# Patient Record
Sex: MALE | Race: WHITE | URBAN - METROPOLITAN AREA
[De-identification: names, ages, dates, MRNs, and addresses within clinical notes are randomized per-mention and may not be internally consistent; named-entity substitution may affect disease eponyms.]

---

## 2021-08-20 ENCOUNTER — TELEPHONE (OUTPATIENT)
Dept: INTERNAL MEDICINE CLINIC | Age: 86
End: 2021-08-20

## 2021-08-20 NOTE — TELEPHONE ENCOUNTER
----- Message from [facility] sent at 8/20/2021  9:01 AM EDT -----  Regarding: Dr. Julisa Schwartz Message/Vendor Calls    Caller's first and last name: Pt      Reason for call: Pt checking to see if the records from his previous provider have been received      Callback required yes/no and why: Yes      Best contact number(s): [phone], [phone]      Details to clarify the request: N/A      [facility]

## 2021-08-20 NOTE — TELEPHONE ENCOUNTER
Patient advise that records could take a couple days. Patient filled out release form yesterday. No records as of now. Patient NP appt is Oct 2021. Patient was thankful for the call back.

## 2021-08-31 ENCOUNTER — TELEPHONE (OUTPATIENT)
Dept: INTERNAL MEDICINE CLINIC | Age: 86
End: 2021-08-31

## 2021-08-31 NOTE — TELEPHONE ENCOUNTER
----- Message from Gwen Smith sent at 2021 10:19 AM EDT -----  Regarding: Dr. Lamas [phone]  General Message/Vendor Calls    Caller's first and last name: Pt.       Reason for call: Wants to know if office received medical records. Callback required yes/no and why: Yes, confirm medical records. Best contact number(s): [phone]      Details to clarify the request: N/a.       Gwen Smith

## 2021-09-20 ENCOUNTER — TELEPHONE (OUTPATIENT)
Dept: INTERNAL MEDICINE CLINIC | Age: 86
End: 2021-09-20

## 2021-10-12 ENCOUNTER — OFFICE VISIT (OUTPATIENT)
Dept: INTERNAL MEDICINE CLINIC | Age: 86
End: 2021-10-12
Payer: MEDICARE

## 2021-10-12 ENCOUNTER — TELEPHONE (OUTPATIENT)
Dept: INTERNAL MEDICINE CLINIC | Age: 86
End: 2021-10-12

## 2021-10-12 VITALS
WEIGHT: 151 LBS | HEIGHT: 70 IN | DIASTOLIC BLOOD PRESSURE: 76 MMHG | HEART RATE: 65 BPM | TEMPERATURE: 97.5 F | BODY MASS INDEX: 21.62 KG/M2 | SYSTOLIC BLOOD PRESSURE: 171 MMHG | RESPIRATION RATE: 13 BRPM

## 2021-10-12 DIAGNOSIS — R53.83 FATIGUE, UNSPECIFIED TYPE: ICD-10-CM

## 2021-10-12 DIAGNOSIS — R79.9 ABNORMAL FINDING OF BLOOD CHEMISTRY, UNSPECIFIED: ICD-10-CM

## 2021-10-12 DIAGNOSIS — E78.5 HYPERLIPIDEMIA, UNSPECIFIED HYPERLIPIDEMIA TYPE: ICD-10-CM

## 2021-10-12 DIAGNOSIS — N40.0 BENIGN PROSTATIC HYPERPLASIA, UNSPECIFIED WHETHER LOWER URINARY TRACT SYMPTOMS PRESENT: ICD-10-CM

## 2021-10-12 DIAGNOSIS — H40.9 GLAUCOMA, UNSPECIFIED GLAUCOMA TYPE, UNSPECIFIED LATERALITY: ICD-10-CM

## 2021-10-12 DIAGNOSIS — C67.9 MALIGNANT NEOPLASM OF URINARY BLADDER, UNSPECIFIED SITE (HCC): ICD-10-CM

## 2021-10-12 DIAGNOSIS — J44.9 CHRONIC OBSTRUCTIVE PULMONARY DISEASE, UNSPECIFIED COPD TYPE (HCC): ICD-10-CM

## 2021-10-12 DIAGNOSIS — E55.9 VITAMIN D DEFICIENCY, UNSPECIFIED: ICD-10-CM

## 2021-10-12 DIAGNOSIS — R68.89 OTHER GENERAL SYMPTOMS AND SIGNS: ICD-10-CM

## 2021-10-12 DIAGNOSIS — M79.604 PAIN IN BOTH LOWER EXTREMITIES: ICD-10-CM

## 2021-10-12 DIAGNOSIS — M79.605 PAIN IN BOTH LOWER EXTREMITIES: ICD-10-CM

## 2021-10-12 DIAGNOSIS — I95.1 ORTHOSTATIC HYPOTENSION: Primary | ICD-10-CM

## 2021-10-12 LAB
25(OH)D3 SERPL-MCNC: 37 NG/ML (ref 30–100)
ALBUMIN SERPL-MCNC: 4.2 G/DL (ref 3.5–5)
ALBUMIN/GLOB SERPL: 1.3 {RATIO} (ref 1.1–2.2)
ALP SERPL-CCNC: 57 U/L (ref 45–117)
ALT SERPL-CCNC: 25 U/L (ref 12–78)
ANION GAP SERPL CALC-SCNC: 4 MMOL/L (ref 5–15)
AST SERPL-CCNC: 17 U/L (ref 15–37)
BASOPHILS # BLD: 0 K/UL (ref 0–0.1)
BASOPHILS NFR BLD: 0 % (ref 0–1)
BILIRUB SERPL-MCNC: 0.4 MG/DL (ref 0.2–1)
BUN SERPL-MCNC: 36 MG/DL (ref 6–20)
BUN/CREAT SERPL: 30 (ref 12–20)
CALCIUM SERPL-MCNC: 9.7 MG/DL (ref 8.5–10.1)
CHLORIDE SERPL-SCNC: 112 MMOL/L (ref 97–108)
CHOLEST SERPL-MCNC: 202 MG/DL
CO2 SERPL-SCNC: 21 MMOL/L (ref 21–32)
CREAT SERPL-MCNC: 1.2 MG/DL (ref 0.7–1.3)
DIFFERENTIAL METHOD BLD: ABNORMAL
EOSINOPHIL # BLD: 0.1 K/UL (ref 0–0.4)
EOSINOPHIL NFR BLD: 2 % (ref 0–7)
ERYTHROCYTE [DISTWIDTH] IN BLOOD BY AUTOMATED COUNT: 13.4 % (ref 11.5–14.5)
EST. AVERAGE GLUCOSE BLD GHB EST-MCNC: 117 MG/DL
FERRITIN SERPL-MCNC: 239 NG/ML (ref 26–388)
FOLATE SERPL-MCNC: 13.9 NG/ML (ref 5–21)
GLOBULIN SER CALC-MCNC: 3.2 G/DL (ref 2–4)
GLUCOSE SERPL-MCNC: 116 MG/DL (ref 65–100)
HBA1C MFR BLD: 5.7 % (ref 4–5.6)
HCT VFR BLD AUTO: 36.9 % (ref 36.6–50.3)
HDLC SERPL-MCNC: 68 MG/DL
HDLC SERPL: 3 {RATIO} (ref 0–5)
HGB BLD-MCNC: 11.7 G/DL (ref 12.1–17)
IMM GRANULOCYTES # BLD AUTO: 0 K/UL (ref 0–0.04)
IMM GRANULOCYTES NFR BLD AUTO: 0 % (ref 0–0.5)
IRON SATN MFR SERPL: 32 % (ref 20–50)
IRON SERPL-MCNC: 81 UG/DL (ref 35–150)
LDLC SERPL CALC-MCNC: 123.8 MG/DL (ref 0–100)
LYMPHOCYTES # BLD: 1.6 K/UL (ref 0.8–3.5)
LYMPHOCYTES NFR BLD: 20 % (ref 12–49)
MCH RBC QN AUTO: 30.2 PG (ref 26–34)
MCHC RBC AUTO-ENTMCNC: 31.7 G/DL (ref 30–36.5)
MCV RBC AUTO: 95.3 FL (ref 80–99)
MONOCYTES # BLD: 0.5 K/UL (ref 0–1)
MONOCYTES NFR BLD: 7 % (ref 5–13)
NEUTS SEG # BLD: 5.7 K/UL (ref 1.8–8)
NEUTS SEG NFR BLD: 71 % (ref 32–75)
NRBC # BLD: 0 K/UL (ref 0–0.01)
NRBC BLD-RTO: 0 PER 100 WBC
PLATELET # BLD AUTO: 207 K/UL (ref 150–400)
PMV BLD AUTO: 9.6 FL (ref 8.9–12.9)
POTASSIUM SERPL-SCNC: 4.2 MMOL/L (ref 3.5–5.1)
PROT SERPL-MCNC: 7.4 G/DL (ref 6.4–8.2)
RBC # BLD AUTO: 3.87 M/UL (ref 4.1–5.7)
SODIUM SERPL-SCNC: 137 MMOL/L (ref 136–145)
T4 FREE SERPL-MCNC: 1.1 NG/DL (ref 0.8–1.5)
TIBC SERPL-MCNC: 250 UG/DL (ref 250–450)
TRIGL SERPL-MCNC: 51 MG/DL (ref ?–150)
TSH SERPL DL<=0.05 MIU/L-ACNC: 1.27 UIU/ML (ref 0.36–3.74)
VIT B12 SERPL-MCNC: 557 PG/ML (ref 193–986)
VLDLC SERPL CALC-MCNC: 10.2 MG/DL
WBC # BLD AUTO: 8 K/UL (ref 4.1–11.1)

## 2021-10-12 PROCEDURE — G8427 DOCREV CUR MEDS BY ELIG CLIN: HCPCS | Performed by: INTERNAL MEDICINE

## 2021-10-12 PROCEDURE — G8536 NO DOC ELDER MAL SCRN: HCPCS | Performed by: INTERNAL MEDICINE

## 2021-10-12 PROCEDURE — G8510 SCR DEP NEG, NO PLAN REQD: HCPCS | Performed by: INTERNAL MEDICINE

## 2021-10-12 PROCEDURE — 99204 OFFICE O/P NEW MOD 45 MIN: CPT | Performed by: INTERNAL MEDICINE

## 2021-10-12 PROCEDURE — G0463 HOSPITAL OUTPT CLINIC VISIT: HCPCS | Performed by: INTERNAL MEDICINE

## 2021-10-12 PROCEDURE — 1101F PT FALLS ASSESS-DOCD LE1/YR: CPT | Performed by: INTERNAL MEDICINE

## 2021-10-12 PROCEDURE — G8420 CALC BMI NORM PARAMETERS: HCPCS | Performed by: INTERNAL MEDICINE

## 2021-10-12 RX ORDER — ACETAZOLAMIDE 500 MG/1
CAPSULE, EXTENDED RELEASE ORAL
COMMUNITY
Start: 2021-10-04 | End: 2022-04-12

## 2021-10-12 RX ORDER — MODAFINIL 200 MG/1
100 TABLET ORAL
Qty: 30 TABLET | Refills: 3 | Status: SHIPPED | OUTPATIENT
Start: 2021-10-12 | End: 2021-10-12

## 2021-10-12 RX ORDER — FINASTERIDE 5 MG/1
5 TABLET, FILM COATED ORAL DAILY
COMMUNITY

## 2021-10-12 RX ORDER — BRIMONIDINE TARTRATE 2 MG/ML
SOLUTION/ DROPS OPHTHALMIC
COMMUNITY
Start: 2021-09-13

## 2021-10-12 RX ORDER — ASCORBIC ACID 500 MG
TABLET ORAL
COMMUNITY

## 2021-10-12 RX ORDER — ATORVASTATIN CALCIUM 80 MG/1
40 TABLET, FILM COATED ORAL DAILY
COMMUNITY
End: 2022-04-12 | Stop reason: SDUPTHER

## 2021-10-12 RX ORDER — TIOTROPIUM BROMIDE AND OLODATEROL 3.124; 2.736 UG/1; UG/1
SPRAY, METERED RESPIRATORY (INHALATION)
COMMUNITY
Start: 2021-08-31

## 2021-10-12 NOTE — ASSESSMENT & PLAN NOTE
Denies any shortness of breath, wheezing. Continue Stiolto. No changes recommended.   Followed by pulmonology as well

## 2021-10-12 NOTE — ASSESSMENT & PLAN NOTE
Reports pain in his lower legs that will occur at night and will wake him up from sleep. Ongoing for several months. Possible cramping related to dehydration. Recommend increasing fluid intake.   Check electrolytes today

## 2021-10-12 NOTE — ASSESSMENT & PLAN NOTE
Patient reports a history of \"bladder polyps\" since 1980. Reports he has had several polyps removed, one of which was cancerous. Unable to give me any more history besides this. Followed by Dr. Boubacar Wright with urology who he reports recently seen last month.   We will try to get records to verify history

## 2021-10-12 NOTE — ASSESSMENT & PLAN NOTE
Reports a 6-9-month history of lightheadedness and dizziness that occurs mostly in the afternoon. Worse with standing up. No chest pain, palpitations, shortness of breath, headache, abdominal pain, nausea, vomiting, diarrhea, constipation, bleeding, ear pain. Reports he has seen 3 doctors for this issue with no answer. Has been on finasteride for the past 3 years. Reports drinking about 2 cups of fluid a day. Orthostatics  Laying 172/72, sitting 149/68, standing 138/63  Symptoms most likely related to orthostatic hypotension related to hypovolemia from poor fluid intake. Advised patient to increase his fluid intake to 8 cups of water a day. Reports this is what his previous doctors told him as well.   If no significant improvement despite increasing fluid intake, consider adjustments to finasteride

## 2021-10-12 NOTE — ASSESSMENT & PLAN NOTE
Reports chronic fatigue for the past several years. Reports that he has had a lot of testing in the past which has been negative. He is not sure what kind of testing they have done. Denies any chest pain, shortness of breath, abdominal pain, nausea, vomiting, diarrhea, constipation. Denies any snoring or apnea. Depression screening negative. Previously prescribed modafinil. Still has a bottle from 2 years ago. Reports that he uses it as needed. Unclear etiology. We will get labs to rule out easily reversible causes. Otherwise, likely chronic fatigue. Given elevated blood pressure, do not recommend modafinil at this time.   Recommend trying to correct orthostatic hypotension then addressing possible hypertension

## 2021-10-12 NOTE — PROGRESS NOTES
Assessment and Plan     1. Orthostatic hypotension  Assessment & Plan:  Reports a 6-9-month history of lightheadedness and dizziness that occurs mostly in the afternoon. Worse with standing up. No chest pain, palpitations, shortness of breath, headache, abdominal pain, nausea, vomiting, diarrhea, constipation, bleeding, ear pain. Reports he has seen 3 doctors for this issue with no answer. Has been on finasteride for the past 3 years. Reports drinking about 2 cups of fluid a day. Orthostatics  Laying 172/72, sitting 149/68, standing 138/63  Symptoms most likely related to orthostatic hypotension related to hypovolemia from poor fluid intake. Advised patient to increase his fluid intake to 8 cups of water a day. Reports this is what his previous doctors told him as well. If no significant improvement despite increasing fluid intake, consider adjustments to finasteride    2. Fatigue, unspecified type  Assessment & Plan:  Reports chronic fatigue for the past several years. Reports that he has had a lot of testing in the past which has been negative. He is not sure what kind of testing they have done. Denies any chest pain, shortness of breath, abdominal pain, nausea, vomiting, diarrhea, constipation. Denies any snoring or apnea. Depression screening negative. Previously prescribed modafinil. Still has a bottle from 2 years ago. Reports that he uses it as needed. Unclear etiology. We will get labs to rule out easily reversible causes. Otherwise, likely chronic fatigue. Given elevated blood pressure, do not recommend modafinil at this time. Recommend trying to correct orthostatic hypotension then addressing possible hypertension  Orders:  -     CBC WITH AUTOMATED DIFF; Future  -     HEMOGLOBIN A1C WITH EAG; Future  -     METABOLIC PANEL, COMPREHENSIVE; Future  -     VITAMIN D, 25 HYDROXY; Future  -     VITAMIN B12 & FOLATE; Future  -     TSH 3RD GENERATION; Future  -     T4, FREE;  Future  -     IRON PROFILE; Future  -     FERRITIN; Future  3. Hyperlipidemia, unspecified hyperlipidemia type  Assessment & Plan: On atorvastatin 80. Check labs today. No changes recommended at this time  Orders:  -     LIPID PANEL; Future  4. Abnormal finding of blood chemistry, unspecified   -     HEMOGLOBIN A1C WITH EAG; Future  -     IRON PROFILE; Future  -     FERRITIN; Future  5. Vitamin D deficiency, unspecified   -     VITAMIN D, 25 HYDROXY; Future  6. Other general symptoms and signs   -     VITAMIN B12 & FOLATE; Future  7. Chronic obstructive pulmonary disease, unspecified COPD type ([facility] [address]  Assessment & Plan:  Denies any shortness of breath, wheezing. Continue Stiolto. No changes recommended. Followed by pulmonology as well  8. Pain in both lower extremities  Assessment & Plan:  Reports pain in his lower legs that will occur at night and will wake him up from sleep. Ongoing for several months. Possible cramping related to dehydration. Recommend increasing fluid intake. Check electrolytes today  9. Benign prostatic hyperplasia, unspecified whether lower urinary tract symptoms present  Assessment & Plan: On finasteride 5 mg daily, no changes recommended  10. Malignant neoplasm of urinary bladder, unspecified site [facility])  Assessment & Plan:  Patient reports a history of \"bladder polyps\" since 1980. Reports he has had several polyps removed, one of which was cancerous. Unable to give me any more history besides this. Followed by Dr. Steve Sofia with urology who he reports recently seen last month. We will try to get records to verify history  11. Glaucoma, unspecified glaucoma type, unspecified laterality  Assessment & Plan:  Uses eye drops, followed by optho       Benefits, risks, possible drug interactions, and side effects of all new medications were reviewed with the patient. Pt verbalized understanding.     Return to clinic:  3 months for orthostatic hypotension, fatigue    An electronic signature was used to authenticate this note. Marcy Nixon MD  [facility]  10/12/2021    Future Appointments   Date Time Provider Steve Vitale   3/60/3796  3:06 AM Ivon Lara MD Count includes the [facility] BS AMB        History of Present Illness   Chief Complaint   Establish sylvia Hernandez is a 80 y.o. male         Review of Systems   Constitutional: Negative for chills and fever. HENT: Positive for hearing loss. Eyes: Negative for blurred vision. Respiratory: Negative for shortness of breath. Cardiovascular: Negative for chest pain. Gastrointestinal: Negative for abdominal pain, blood in stool, constipation, diarrhea, melena, nausea and vomiting. Genitourinary: Negative for dysuria and hematuria. Musculoskeletal: Negative for joint pain. Skin: Negative for rash. Neurological: Negative for headaches. Past Medical History   No Known Allergies     Current Outpatient Medications   Medication Sig    finasteride (PROSCAR) 5 mg tablet Take 5 mg by mouth daily.  atorvastatin (LIPITOR) 80 mg tablet Take 40 mg by mouth daily. (takes 1/2 tab of 80)    ascorbic acid, vitamin C, (VITAMIN C) 500 mg tablet Take  by mouth. otc    Stiolto Respimat 2.5-2.5 mcg/actuation inhaler     brimonidine (ALPHAGAN) 0.2 % ophthalmic solution     acetaZOLAMIDE SR (DIAMOX) 500 mg capsule      No current facility-administered medications for this visit.           Patient Active Problem List   Diagnosis Code    [facility] J44.9    Fatigue, unspecified type R53.83    Hyperlipidemia, unspecified hyperlipidemia type E78.5    Pain in both lower extremities M79.604, M79.605    Orthostatic hypotension I95.1    Benign prostatic hyperplasia, unspecified whether lower urinary tract symptoms present N40.0    Malignant neoplasm of urinary bladder, unspecified site ([facility] [address] C67.9    Glaucoma H40.9     Past Surgical History:   Procedure Laterality Date  HX ORTHOPAEDIC      left hip fracture repair      Social History     Tobacco Use    Smoking status: Current Every Day Smoker     Types: Cigarettes    Smokeless tobacco: Never Used    Tobacco comment: 10 cigarettes every day   Substance Use Topics    Alcohol use: Yes     Comment: 4oz daily wine      History reviewed. No pertinent family history. Physical Exam   Vitals:       Visit Vitals  BP (!) 171/76 (BP 1 Location: Left upper arm, BP Patient Position: Sitting, BP Cuff Size: Adult)   Pulse 65   Temp 97.5 °F (36.4 °C) (Temporal)   Resp 13   Ht 5' 10\" (1.778 m)   Wt 151 lb (68.5 kg)   BMI 21.67 kg/m²        Physical Exam  Constitutional:       General: He is not in acute distress. Appearance: He is well-developed. Comments: Extremely hard of hearing   HENT:      Right Ear: Tympanic membrane, ear canal and external ear normal.      Left Ear: Tympanic membrane, ear canal and external ear normal.      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 Conjunctiva/sclera: Conjunctivae normal.   Cardiovascular:      Rate and Rhythm: Normal rate and regular rhythm. Pulses: Normal pulses. Heart sounds: No murmur heard. No friction rub. No gallop. Comments: Distant heart sounds  Pulmonary:      Effort: No respiratory distress. Breath sounds: No wheezing, rhonchi or rales. Abdominal:      General: Bowel sounds are normal. There is no distension. Palpations: Abdomen is soft. There is no hepatomegaly, splenomegaly or mass. Tenderness: There is no abdominal tenderness. There is no guarding. Musculoskeletal:      Cervical back: Neck supple. Right lower leg: No edema. Left lower leg: No edema. Skin:     General: Skin is warm. Findings: No rash. Neurological:      Mental Status: He is alert.

## 2021-10-12 NOTE — TELEPHONE ENCOUNTER
Letter sent via fax to Dr. Ruben Pulido requesting last two office notes. fax confirmed as sent successfully.

## 2021-10-12 NOTE — TELEPHONE ENCOUNTER
----- Message from Brandyn Anthony MD sent at 22/02/9226  1:19 PM EDT -----  Regarding: records  Please request last 2 office notes from [address] [facility] urology - I believe he sees Dr. Governor Skelton. Thanks!

## 2021-10-18 NOTE — PROGRESS NOTES
Letter sent. Ferritin 239. Iron profile normal.  Thyroid studies normal.  B12 and folate normal.  Vitamin D 37. . CMP normal.  A1c 5.7. Hemoglobin 11.7. CBC otherwise normal.    Hgb 11.7, normal iron/b12/folate. Possibly anemia of chronic disease, rec monitoring  LDL at goal.  A1c prediabetic.   Likely chronic fatigue, unclear etiology

## 2022-01-12 ENCOUNTER — OFFICE VISIT (OUTPATIENT)
Dept: INTERNAL MEDICINE CLINIC | Age: 87
End: 2022-01-12
Payer: MEDICARE

## 2022-01-12 VITALS
TEMPERATURE: 97.3 F | OXYGEN SATURATION: 98 % | DIASTOLIC BLOOD PRESSURE: 91 MMHG | RESPIRATION RATE: 13 BRPM | SYSTOLIC BLOOD PRESSURE: 181 MMHG | HEIGHT: 70 IN | WEIGHT: 155 LBS | HEART RATE: 70 BPM | BODY MASS INDEX: 22.19 KG/M2

## 2022-01-12 DIAGNOSIS — I95.1 ORTHOSTATIC HYPOTENSION: Primary | ICD-10-CM

## 2022-01-12 DIAGNOSIS — J44.9 CHRONIC OBSTRUCTIVE PULMONARY DISEASE, UNSPECIFIED COPD TYPE (HCC): ICD-10-CM

## 2022-01-12 DIAGNOSIS — C67.9 MALIGNANT NEOPLASM OF URINARY BLADDER, UNSPECIFIED SITE (HCC): ICD-10-CM

## 2022-01-12 DIAGNOSIS — R03.0 ELEVATED BLOOD PRESSURE READING: ICD-10-CM

## 2022-01-12 PROCEDURE — G8420 CALC BMI NORM PARAMETERS: HCPCS | Performed by: INTERNAL MEDICINE

## 2022-01-12 PROCEDURE — G0463 HOSPITAL OUTPT CLINIC VISIT: HCPCS | Performed by: INTERNAL MEDICINE

## 2022-01-12 PROCEDURE — G8510 SCR DEP NEG, NO PLAN REQD: HCPCS | Performed by: INTERNAL MEDICINE

## 2022-01-12 PROCEDURE — 1101F PT FALLS ASSESS-DOCD LE1/YR: CPT | Performed by: INTERNAL MEDICINE

## 2022-01-12 PROCEDURE — G8427 DOCREV CUR MEDS BY ELIG CLIN: HCPCS | Performed by: INTERNAL MEDICINE

## 2022-01-12 PROCEDURE — 99214 OFFICE O/P EST MOD 30 MIN: CPT | Performed by: INTERNAL MEDICINE

## 2022-01-12 PROCEDURE — G8536 NO DOC ELDER MAL SCRN: HCPCS | Performed by: INTERNAL MEDICINE

## 2022-01-12 NOTE — PATIENT INSTRUCTIONS
Bring your blood pressure cuff to the clinic to get it checked  Talk to Dr. Holger Rudd about potentially stopping your finasteride due to your lightheadedness

## 2022-01-12 NOTE — PROGRESS NOTES
Note   Chief Complaint   Blood pressure    Lexi Orozco is a 80 y.o. male     1. Orthostatic hypotension  Assessment & Plan:   Lightheadedness maybe a little better with increased fluid, although only drinking ~4 cups fluid a day. Unable to drink more than that  Discussed potentially a trial off of finasteride to see if that helps. He would like to discuss this with his urologist Dr. Cortez paris  2. Elevated blood pressure reading  Assessment & Plan:  Blood pressure elevated last visit and this visit. Reports he takes his blood pressure at home and it runs 110s130s. Never over 150s. Also has orthostatic hypotension. No chest pain, shortness of breath, headaches, vision changes. Recommend bringing his blood pressure cuff by to check. If truly more normal at home, recommend monitoring but if elevated, consider amlodipine  3. Chronic obstructive pulmonary disease, unspecified COPD type ([facility] [address]  Assessment & Plan:   well controlled, continue current medications  4. Malignant neoplasm of urinary bladder, unspecified site [facility])  Assessment & Plan:   monitored by specialist. No acute findings meriting change in the plan   Have not been able to get records from them yet         Benefits, risks, possible drug interactions, and side effects of all new medications were reviewed with the patient. Pt verbalized understanding. Return to clinic: 3 months for Medicare wellness, blood pressure, orthostatic hypotension earlier if needed    An electronic signature was used to authenticate this note.   Amaryllis Prader, MD  [facility]  1/12/2022    Future Appointments   Date Time Provider Steve Vitale   0/10/5707  5:84 AM Dimitry Tovar MD [facility] BS AMB        Objective   Vitals:       Visit Vitals  BP (!) 181/91 (BP 1 Location: Left upper arm, BP Patient Position: Sitting, BP Cuff Size: Adult)   Pulse 70   Temp 97.3 °F (36.3 °C) (Temporal)   Resp 13   Ht 5' 10\" (1.778 m) Wt 155 lb (70.3 kg)   SpO2 98%   BMI 22.24 kg/m²        Physical Exam  Constitutional:       Appearance: Normal appearance. He is not ill-appearing. Cardiovascular:      Rate and Rhythm: Normal rate and regular rhythm. Heart sounds: No murmur heard. No friction rub. No gallop. Pulmonary:      Effort: No respiratory distress. Breath sounds: Normal breath sounds. No wheezing, rhonchi or rales. Neurological:      Mental Status: He is alert. Current Outpatient Medications   Medication Sig    finasteride (PROSCAR) 5 mg tablet Take 5 mg by mouth daily.  atorvastatin (LIPITOR) 80 mg tablet Take 40 mg by mouth daily. (takes 1/2 tab of 80)    Stiolto Respimat 2.5-2.5 mcg/actuation inhaler     brimonidine (ALPHAGAN) 0.2 % ophthalmic solution     ascorbic acid, vitamin C, (VITAMIN C) 500 mg tablet Take  by mouth. otc    acetaZOLAMIDE SR (DIAMOX) 500 mg capsule      No current facility-administered medications for this visit.

## 2022-01-12 NOTE — ASSESSMENT & PLAN NOTE
monitored by specialist. No acute findings meriting change in the plan   Have not been able to get records from them yet

## 2022-01-12 NOTE — ASSESSMENT & PLAN NOTE
Lightheadedness maybe a little better with increased fluid, although only drinking ~4 cups fluid a day. Unable to drink more than that  Discussed potentially a trial off of finasteride to see if that helps.   He would like to discuss this with his urologist Dr. Kee Osgood first

## 2022-01-12 NOTE — ASSESSMENT & PLAN NOTE
Blood pressure elevated last visit and this visit. Reports he takes his blood pressure at home and it runs 110s130s. Never over 150s. Also has orthostatic hypotension. No chest pain, shortness of breath, headaches, vision changes. Recommend bringing his blood pressure cuff by to check.   If truly more normal at home, recommend monitoring but if elevated, consider amlodipine

## 2022-03-18 PROBLEM — R53.83 FATIGUE, UNSPECIFIED TYPE: Status: ACTIVE | Noted: 2021-10-12

## 2022-03-18 PROBLEM — C67.9 MALIGNANT NEOPLASM OF URINARY BLADDER, UNSPECIFIED SITE (HCC): Status: ACTIVE | Noted: 2021-10-12

## 2022-03-19 PROBLEM — M79.604 PAIN IN BOTH LOWER EXTREMITIES: Status: ACTIVE | Noted: 2021-10-12

## 2022-03-19 PROBLEM — R03.0 ELEVATED BLOOD PRESSURE READING: Status: ACTIVE | Noted: 2022-01-12

## 2022-03-19 PROBLEM — I95.1 ORTHOSTATIC HYPOTENSION: Status: ACTIVE | Noted: 2021-10-12

## 2022-03-19 PROBLEM — J44.9 COPD (CHRONIC OBSTRUCTIVE PULMONARY DISEASE) (HCC): Status: ACTIVE | Noted: 2021-10-12

## 2022-03-19 PROBLEM — M79.605 PAIN IN BOTH LOWER EXTREMITIES: Status: ACTIVE | Noted: 2021-10-12

## 2022-03-19 PROBLEM — H40.9 GLAUCOMA: Status: ACTIVE | Noted: 2021-10-12

## 2022-03-19 PROBLEM — E78.5 HYPERLIPIDEMIA, UNSPECIFIED HYPERLIPIDEMIA TYPE: Status: ACTIVE | Noted: 2021-10-12

## 2022-03-19 PROBLEM — N40.0 BENIGN PROSTATIC HYPERPLASIA, UNSPECIFIED WHETHER LOWER URINARY TRACT SYMPTOMS PRESENT: Status: ACTIVE | Noted: 2021-10-12

## 2022-04-12 ENCOUNTER — OFFICE VISIT (OUTPATIENT)
Dept: INTERNAL MEDICINE CLINIC | Age: 87
End: 2022-04-12
Payer: MEDICARE

## 2022-04-12 VITALS
BODY MASS INDEX: 21.76 KG/M2 | HEIGHT: 70 IN | TEMPERATURE: 97.9 F | SYSTOLIC BLOOD PRESSURE: 188 MMHG | OXYGEN SATURATION: 98 % | HEART RATE: 77 BPM | RESPIRATION RATE: 14 BRPM | DIASTOLIC BLOOD PRESSURE: 75 MMHG | WEIGHT: 152 LBS

## 2022-04-12 DIAGNOSIS — R03.0 ELEVATED BLOOD PRESSURE READING: ICD-10-CM

## 2022-04-12 DIAGNOSIS — I95.1 ORTHOSTATIC HYPOTENSION: ICD-10-CM

## 2022-04-12 DIAGNOSIS — Z00.00 MEDICARE ANNUAL WELLNESS VISIT, SUBSEQUENT: Primary | ICD-10-CM

## 2022-04-12 DIAGNOSIS — E78.5 HYPERLIPIDEMIA, UNSPECIFIED HYPERLIPIDEMIA TYPE: ICD-10-CM

## 2022-04-12 PROCEDURE — G0439 PPPS, SUBSEQ VISIT: HCPCS | Performed by: INTERNAL MEDICINE

## 2022-04-12 PROCEDURE — G8536 NO DOC ELDER MAL SCRN: HCPCS | Performed by: INTERNAL MEDICINE

## 2022-04-12 PROCEDURE — G8427 DOCREV CUR MEDS BY ELIG CLIN: HCPCS | Performed by: INTERNAL MEDICINE

## 2022-04-12 PROCEDURE — 99214 OFFICE O/P EST MOD 30 MIN: CPT | Performed by: INTERNAL MEDICINE

## 2022-04-12 PROCEDURE — 1101F PT FALLS ASSESS-DOCD LE1/YR: CPT | Performed by: INTERNAL MEDICINE

## 2022-04-12 PROCEDURE — G0463 HOSPITAL OUTPT CLINIC VISIT: HCPCS | Performed by: INTERNAL MEDICINE

## 2022-04-12 PROCEDURE — G8420 CALC BMI NORM PARAMETERS: HCPCS | Performed by: INTERNAL MEDICINE

## 2022-04-12 PROCEDURE — G8510 SCR DEP NEG, NO PLAN REQD: HCPCS | Performed by: INTERNAL MEDICINE

## 2022-04-12 RX ORDER — LATANOPROST 50 UG/ML
SOLUTION/ DROPS OPHTHALMIC
COMMUNITY
Start: 2022-01-10

## 2022-04-12 RX ORDER — ATORVASTATIN CALCIUM 80 MG/1
80 TABLET, FILM COATED ORAL DAILY
Qty: 90 TABLET | Refills: 3 | Status: SHIPPED | OUTPATIENT
Start: 2022-04-12

## 2022-04-12 NOTE — ASSESSMENT & PLAN NOTE
120s-150s in the morning at home, 100-130s in the afternoon  Notices when his BP is lower and feels more lightheaded  Continues to be high here. No sx. rec bringinc uff to next visit to verify but he does have lightheadedness with lower numbers.  Has not increased in fluid in take to help with orthostatics - encouraged increasing fluid intake  Change finasteride to night time

## 2022-04-12 NOTE — PROGRESS NOTES
Note   Chief Complaint   MCW    Madhu Florence is a 80 y.o. male     1. Medicare annual wellness visit, subsequent  Assessment & Plan:  Declines PNA vaccine, declines shingles vaccine, declines TDAP  Did get COVID vaccine plus booster but can't remember dates  2. Hyperlipidemia, unspecified hyperlipidemia type  Assessment & Plan:   well controlled, continue current medications   atorva 80  Orders:  -     atorvastatin (LIPITOR) 80 mg tablet; Take 1 Tablet by mouth daily. Indications: high cholesterol, Normal, Disp-90 Tablet, R-3  3. Elevated blood pressure reading  Assessment & Plan:  120s-150s in the morning at home, 100-130s in the afternoon  Notices when his BP is lower and feels more lightheaded  Continues to be high here. No sx. rec bringinc uff to next visit to verify but he does have lightheadedness with lower numbers. Has not increased in fluid in take to help with orthostatics - encouraged increasing fluid intake  Change finasteride to night time  4. Orthostatic hypotension  Assessment & Plan:  Has not significantly increased fluid intake  Pt wants to stay on finasteride. rec switching to nighttime to see if that helps with low bp in the afternoon and high bp in the mornig       Benefits, risks, possible drug interactions, and side effects of all new medications were reviewed with the patient. Pt verbalized understanding. Return to clinic:  2 mo for BP  1 son in town  [place] [facility].   Melody Eaton MD  [facility]  4/12/2022    Future Appointments   Date Time Provider Steve Gabrieli   5/88/5316  0:96 AM Maik Rick MD Pending sale to [facility] BS AMB        Objective   Vitals:       Visit Vitals  BP (!) 188/75 (BP 1 Location: Left upper arm, BP Patient Position: Sitting, BP Cuff Size: Adult)   Pulse 77   Temp 97.9 °F (36.6 °C) (Oral)   Resp 14   Ht 5' 10\" (1.778 m)   Wt 152 lb (68.9 kg)   SpO2 98% BMI 21.81 kg/m²        Physical Exam  Constitutional:       General: He is not in acute distress. Appearance: He is well-developed. HENT:      Right Ear: Tympanic membrane, ear canal and external ear normal.      Left Ear: Tympanic membrane, ear canal and external ear normal.   Eyes:      Extraocular Movements: Extraocular movements intact. Conjunctiva/sclera: Conjunctivae normal.   Cardiovascular:      Rate and Rhythm: Normal rate and regular rhythm. Pulses: Normal pulses. Heart sounds: No murmur heard. No friction rub. No gallop. Pulmonary:      Effort: No respiratory distress. Breath sounds: No wheezing, rhonchi or rales. Abdominal:      General: Bowel sounds are normal. There is no distension. Palpations: Abdomen is soft. There is no hepatomegaly, splenomegaly or mass. Tenderness: There is no abdominal tenderness. There is no guarding. Musculoskeletal:      Cervical back: Neck supple. Skin:     General: Skin is warm. Findings: No rash. Neurological:      Mental Status: He is alert. Current Outpatient Medications   Medication Sig    latanoprost (XALATAN) 0.005 % ophthalmic solution INSTILL 1 DROP INTO RIGHT EYE AT BEDTIME    atorvastatin (LIPITOR) 80 mg tablet Take 1 Tablet by mouth daily. Indications: high cholesterol    finasteride (PROSCAR) 5 mg tablet Take 5 mg by mouth daily.  Stiolto Respimat 2.5-2.5 mcg/actuation inhaler     brimonidine (ALPHAGAN) 0.2 % ophthalmic solution     ascorbic acid, vitamin C, (VITAMIN C) 500 mg tablet Take  by mouth. otc     No current facility-administered medications for this visit. This is the Subsequent Medicare Annual Wellness Exam, performed 12 months or more after the Initial AWV or the last Subsequent AWV    I have reviewed the patient's medical history in detail and updated the computerized patient record.        Assessment/Plan   Education and counseling provided:  Are appropriate based on today's review and evaluation    1. Medicare annual wellness visit, subsequent  Assessment & Plan:  Declines PNA vaccine, declines shingles vaccine, declines TDAP  Did get COVID vaccine plus booster but can't remember dates  2. Hyperlipidemia, unspecified hyperlipidemia type  Assessment & Plan:   well controlled, continue current medications   atorva 80  Orders:  -     atorvastatin (LIPITOR) 80 mg tablet; Take 1 Tablet by mouth daily. Indications: high cholesterol, Normal, Disp-90 Tablet, R-3  3. Elevated blood pressure reading  Assessment & Plan:  120s-150s in the morning at home, 100-130s in the afternoon  Notices when his BP is lower and feels more lightheaded  Continues to be high here. No sx. rec bringinc uff to next visit to verify but he does have lightheadedness with lower numbers. Has not increased in fluid in take to help with orthostatics - encouraged increasing fluid intake  Change finasteride to night time  4. Orthostatic hypotension  Assessment & Plan:  Has not significantly increased fluid intake  Pt wants to stay on finasteride. rec switching to nighttime to see if that helps with low bp in the afternoon and high bp in the [facility]       Depression Risk Factor Screening     3 most recent PHQ Screens 4/12/2022   Little interest or pleasure in doing things Not at all   Feeling down, depressed, irritable, or hopeless Not at all   Total Score PHQ 2 0       Alcohol & Drug Abuse Risk Screen    Do you average more than 1 drink per night or more than 7 drinks a week: No    In the past three months have you have had more than 4 drinks containing alcohol on one occasion: No          Functional Ability and Level of Safety    Hearing: +hearing loss      Activities of Daily Living: The home contains: handrails and grab bars  Patient does total self care      Ambulation: with no difficulty     Fall Risk:  Fall Risk Assessment, last 12 mths 4/12/2022   Able to walk?  Yes   Fall in past 12 months? 0   Do you feel unsteady? 0   Are you worried about falling 0   Is TUG test greater than 12 seconds? -   Is the gait abnormal? -   Number of falls in past 12 months -   Fall with injury? -      Abuse Screen:  Patient is not abused       Cognitive Screening    Has your family/caregiver stated any concerns about your memory: no         Health Maintenance Due     Health Maintenance Due   Topic Date Due    COVID-19 Vaccine (1) Never done    DTaP/Tdap/Td series (1 - Tdap) Never done    Shingrix Vaccine Age 50> (1 of 2) Never done    Pneumococcal 65+ years (1 of 1 - PPSV23) Never done       Patient Care Team   Patient Care Team:  Lydia Puga MD as PCP - General (Internal Medicine)  Lydia Puga MD as PCP - [facility] Lis Umana Provider    History     Patient Active Problem List   Diagnosis Code    COPD (chronic obstructive pulmonary disease) ([facility] [address] J44.9    Fatigue, unspecified type R53.83    Hyperlipidemia, unspecified hyperlipidemia type E78.5    Pain in both lower extremities M79.604, M79.605    Orthostatic hypotension I95.1    Benign prostatic hyperplasia, unspecified whether lower urinary tract symptoms present N40.0    Malignant neoplasm of urinary bladder, unspecified site ([facility] [address] C67.9    Glaucoma H40.9    Elevated blood pressure reading R03.0    Medicare annual wellness visit, subsequent Z00.00     History reviewed. No pertinent past medical history. Past Surgical History:   Procedure Laterality Date    HX ORTHOPAEDIC      left hip fracture repair     Current Outpatient Medications   Medication Sig Dispense Refill    latanoprost (XALATAN) 0.005 % ophthalmic solution INSTILL 1 DROP INTO RIGHT EYE AT BEDTIME      atorvastatin (LIPITOR) 80 mg tablet Take 1 Tablet by mouth daily. Indications: high cholesterol 90 Tablet 3    finasteride (PROSCAR) 5 mg tablet Take 5 mg by mouth daily.       Stiolto Respimat 2.5-2.5 mcg/actuation inhaler       brimonidine (ALPHAGAN) 0.2 % ophthalmic solution       ascorbic acid, vitamin C, (VITAMIN C) 500 mg tablet Take  by mouth. otc       No Known Allergies    History reviewed. No pertinent family history.   Social History     Tobacco Use    Smoking status: Current Every Day Smoker     Types: Cigarettes    Smokeless tobacco: Never Used    Tobacco comment: 10 cigarettes every day   Substance Use Topics    Alcohol use: Yes     Comment: 4oz daily wine         Ryan Hallman MD

## 2022-04-12 NOTE — ASSESSMENT & PLAN NOTE
Has not significantly increased fluid intake  Pt wants to stay on finasteride.  rec switching to nighttime to see if that helps with low bp in the afternoon and high bp in the mornig

## 2022-04-12 NOTE — ASSESSMENT & PLAN NOTE
Declines PNA vaccine, declines shingles vaccine, declines TDAP  Did get COVID vaccine plus booster but can't remember dates

## 2022-04-12 NOTE — PATIENT INSTRUCTIONS
Change finasteride to night time    Medicare Wellness Visit, Male    The best way to live healthy is to have a lifestyle where you eat a well-balanced diet, exercise regularly, limit alcohol use, and quit all forms of tobacco/nicotine, if applicable. Regular preventive services are another way to keep healthy. Preventive services (vaccines, screening tests, monitoring & exams) can help personalize your care plan, which helps you manage your own care. Screening tests can find health problems at the earliest stages, when they are easiest to treat. [facility] follows the current, evidence-based guidelines published by the [facility] States Cain Garry ([facility]) when recommending preventive services for our patients. Because we follow these guidelines, sometimes recommendations change over time as research supports it. (For example, a prostate screening blood test is no longer routinely recommended for men with no symptoms). Of course, you and your doctor may decide to screen more often for some diseases, based on your risk and co-morbidities (chronic disease you are already diagnosed with). Preventive services for you include:  - Medicare offers their members a free annual wellness visit, which is time for you and your primary care provider to discuss and plan for your preventive service needs. Take advantage of this benefit every year!  -All adults over age 72 should receive the recommended pneumonia vaccines. Current USPSTF guidelines recommend a series of two vaccines for the best pneumonia protection.   -All adults should have a flu vaccine yearly and tetanus vaccine every 10 years.  -All adults age 48 and older should receive the shingles vaccines (series of two vaccines).        -All adults age 38-68 who are overweight should have a diabetes screening test once every three years.   -Other screening tests & preventive services for persons with diabetes include: an eye exam to screen for diabetic retinopathy, a kidney function test, a foot exam, and stricter control over your cholesterol.   -Cardiovascular screening for adults with routine risk involves an electrocardiogram (ECG) at intervals determined by the provider.   -Colorectal cancer screening should be done for adults age 54-65 with no increased risk factors for colorectal cancer. There are a number of acceptable methods of screening for this type of cancer. Each test has its own benefits and drawbacks. Discuss with your provider what is most appropriate for you during your annual wellness visit. The different tests include: colonoscopy (considered the best screening method), a fecal occult blood test, a fecal DNA test, and sigmoidoscopy.  -All adults born between [facility] should be screened once for Hepatitis C.  -An Abdominal Aortic Aneurysm (AAA) Screening is recommended for men age 73-68 who has ever smoked in their lifetime.      Here is a list of your current Health Maintenance items (your personalized list of preventive services) with a due date:  Health Maintenance Due   Topic Date Due    COVID-19 Vaccine (1) Never done    DTaP/Tdap/Td  (1 - Tdap) Never done    Shingles Vaccine (1 of 2) Never done    Pneumococcal Vaccine (1 of 1 - PPSV23) Never done

## 2022-05-12 PROBLEM — Z00.00 MEDICARE ANNUAL WELLNESS VISIT, SUBSEQUENT: Status: RESOLVED | Noted: 2022-04-12 | Resolved: 2022-05-12

## 2022-06-21 ENCOUNTER — OFFICE VISIT (OUTPATIENT)
Dept: INTERNAL MEDICINE CLINIC | Age: 87
End: 2022-06-21
Payer: MEDICARE

## 2022-06-21 VITALS
HEART RATE: 64 BPM | HEIGHT: 70 IN | WEIGHT: 151 LBS | SYSTOLIC BLOOD PRESSURE: 145 MMHG | OXYGEN SATURATION: 98 % | TEMPERATURE: 97.6 F | BODY MASS INDEX: 21.62 KG/M2 | DIASTOLIC BLOOD PRESSURE: 85 MMHG

## 2022-06-21 DIAGNOSIS — R03.0 ELEVATED BLOOD PRESSURE READING: ICD-10-CM

## 2022-06-21 DIAGNOSIS — I95.1 ORTHOSTATIC HYPOTENSION: ICD-10-CM

## 2022-06-21 DIAGNOSIS — R05.3 CHRONIC COUGH: ICD-10-CM

## 2022-06-21 PROCEDURE — G8427 DOCREV CUR MEDS BY ELIG CLIN: HCPCS | Performed by: INTERNAL MEDICINE

## 2022-06-21 PROCEDURE — 99214 OFFICE O/P EST MOD 30 MIN: CPT | Performed by: INTERNAL MEDICINE

## 2022-06-21 PROCEDURE — G8420 CALC BMI NORM PARAMETERS: HCPCS | Performed by: INTERNAL MEDICINE

## 2022-06-21 PROCEDURE — G8510 SCR DEP NEG, NO PLAN REQD: HCPCS | Performed by: INTERNAL MEDICINE

## 2022-06-21 PROCEDURE — 1101F PT FALLS ASSESS-DOCD LE1/YR: CPT | Performed by: INTERNAL MEDICINE

## 2022-06-21 PROCEDURE — G0463 HOSPITAL OUTPT CLINIC VISIT: HCPCS | Performed by: INTERNAL MEDICINE

## 2022-06-21 PROCEDURE — G8536 NO DOC ELDER MAL SCRN: HCPCS | Performed by: INTERNAL MEDICINE

## 2022-06-21 NOTE — PROGRESS NOTES
Identified pt with two pt identifiers. Reviewed record in preparation for visit and have obtained necessary documentation. All patient medications has been reviewed. Chief Complaint   Patient presents with    Follow-up     2month    Hypertension     Additional information about chief complaint:    Visit Vitals  BP (!) 189/78 (BP 1 Location: Left upper arm, BP Patient Position: Sitting, BP Cuff Size: Adult)   Pulse 64   Temp 97.6 °F (36.4 °C) (Oral)   Ht 5' 10\" (1.778 m)   Wt 151 lb (68.5 kg)   SpO2 98%   BMI 21.67 kg/m²       Health Maintenance Due   Topic    COVID-19 Vaccine (1)    Pneumococcal 65+ years (1 - PCV)    DTaP/Tdap/Td series (1 - Tdap)    Shingrix Vaccine Age 50> (1 of 2)       1. Have you been to the ER, urgent care clinic since your last visit? Hospitalized since your last visit? No    2. Have you seen or consulted any other health care providers outside of the [address] [place] [address] since your last visit? Include any pap smears or colon screening.  No

## 2022-06-21 NOTE — PROGRESS NOTES
Note   Chief Complaint   bp    Namrata Seals is a 80 y.o. male     1. Elevated blood pressure reading  Assessment & Plan:  Elevated today though better on repeat  His cuff reads about 15 pts higher than ours  Has nto been taking at home  Elevated but still having orthostatic issues. Previously advised switching finasteride to nighttime but he has not made that change yet. rec changing finasteride to night time. 2. Orthostatic hypotension  Assessment & Plan:  Has not significantly increased fluid intake  Previously advised switching finasteride to nighttime but he has not made that change yet. rec changing finasteride to night time. 3. Chronic cough  Assessment & Plan:  Notes chronic cough for years though seems to be worse at night  History of copd, taking his inhaler  Crackles BL bases on exam. Discussed potentially getting a CXR but pt reports he sees Dr. Noel Marques with pulm tomorrow and would like to defer to him  ddx includes COPD, GERD, CHF       Benefits, risks, possible drug interactions, and side effects of all new medications were reviewed with the patient. Pt verbalized understanding. Return to clinic:  4 months for bp  1 son in town  Moved here from [address] [place] [address] was used to authenticate this note. Windy Davies MD  [facility]  6/21/2022    Future Appointments   Date Time Provider Steve Vitale   74/07/3690  5:03 AM Johnathon Hill MD [facility] BS AMB        Objective   Vitals:       Visit Vitals  BP (!) 189/78 (BP 1 Location: Left upper arm, BP Patient Position: Sitting, BP Cuff Size: Adult)   Pulse 64   Temp 97.6 °F (36.4 °C) (Oral)   Ht 5' 10\" (1.778 m)   Wt 151 lb (68.5 kg)   SpO2 98%   BMI 21.67 kg/m²        Physical Exam  Constitutional:       Appearance: Normal appearance. He is not ill-appearing. Cardiovascular:      Rate and Rhythm: Normal rate and regular rhythm. Heart sounds: No murmur heard. No friction rub.  No gallop. Comments: Distant heart sounds  Pulmonary:      Effort: No respiratory distress. Breath sounds: Rales (crackles bl bases) present. No wheezing or rhonchi. Neurological:      Mental Status: He is alert. Current Outpatient Medications   Medication Sig    latanoprost (XALATAN) 0.005 % ophthalmic solution INSTILL 1 DROP INTO RIGHT EYE AT BEDTIME    atorvastatin (LIPITOR) 80 mg tablet Take 1 Tablet by mouth daily. Indications: high cholesterol    finasteride (PROSCAR) 5 mg tablet Take 5 mg by mouth daily.  Stiolto Respimat 2.5-2.5 mcg/actuation inhaler     brimonidine (ALPHAGAN) 0.2 % ophthalmic solution     ascorbic acid, vitamin C, (VITAMIN C) 500 mg tablet Take  by mouth. otc     No current facility-administered medications for this visit.

## 2022-06-21 NOTE — ASSESSMENT & PLAN NOTE
Elevated today though better on repeat  His cuff reads about 15 pts higher than ours  Has nto been taking at home  Elevated but still having orthostatic issues. Previously advised switching finasteride to nighttime but he has not made that change yet. rec changing finasteride to night time.

## 2022-06-21 NOTE — PATIENT INSTRUCTIONS
Change to taking finasteride at night to see if that helps with lightheadedness    Dr. Staci Arevalo - pt was mentioning a chronic cough that is worse at night. Mariposa crackles at bases, which I don't think I've appreciated before. He elected to defer to you for evaluation and treatment. No leg swelling. No shortness of breath that he's telling me about. I've been seeing him for orthostatic symptoms.

## 2022-06-21 NOTE — ASSESSMENT & PLAN NOTE
Has not significantly increased fluid intake  Previously advised switching finasteride to nighttime but he has not made that change yet. rec changing finasteride to night time.

## 2022-10-25 ENCOUNTER — OFFICE VISIT (OUTPATIENT)
Dept: INTERNAL MEDICINE CLINIC | Age: 87
End: 2022-10-25
Payer: MEDICARE

## 2022-10-25 VITALS
DIASTOLIC BLOOD PRESSURE: 75 MMHG | BODY MASS INDEX: 20.86 KG/M2 | SYSTOLIC BLOOD PRESSURE: 170 MMHG | RESPIRATION RATE: 16 BRPM | HEART RATE: 63 BPM | TEMPERATURE: 97.5 F | OXYGEN SATURATION: 95 % | HEIGHT: 70 IN | WEIGHT: 145.7 LBS

## 2022-10-25 DIAGNOSIS — R68.89 OTHER GENERAL SYMPTOMS AND SIGNS: ICD-10-CM

## 2022-10-25 DIAGNOSIS — R79.9 ABNORMAL FINDING OF BLOOD CHEMISTRY, UNSPECIFIED: ICD-10-CM

## 2022-10-25 DIAGNOSIS — D64.9 ANEMIA, UNSPECIFIED TYPE: ICD-10-CM

## 2022-10-25 DIAGNOSIS — I95.1 ORTHOSTATIC HYPOTENSION: ICD-10-CM

## 2022-10-25 DIAGNOSIS — R35.1 NOCTURIA: ICD-10-CM

## 2022-10-25 DIAGNOSIS — R63.4 WEIGHT LOSS: Primary | ICD-10-CM

## 2022-10-25 DIAGNOSIS — R05.3 CHRONIC COUGH: ICD-10-CM

## 2022-10-25 LAB
COMMENT, HOLDF: NORMAL
SAMPLES BEING HELD,HOLD: NORMAL

## 2022-10-25 PROCEDURE — G8427 DOCREV CUR MEDS BY ELIG CLIN: HCPCS | Performed by: INTERNAL MEDICINE

## 2022-10-25 PROCEDURE — G8536 NO DOC ELDER MAL SCRN: HCPCS | Performed by: INTERNAL MEDICINE

## 2022-10-25 PROCEDURE — 1101F PT FALLS ASSESS-DOCD LE1/YR: CPT | Performed by: INTERNAL MEDICINE

## 2022-10-25 PROCEDURE — G8510 SCR DEP NEG, NO PLAN REQD: HCPCS | Performed by: INTERNAL MEDICINE

## 2022-10-25 PROCEDURE — 99214 OFFICE O/P EST MOD 30 MIN: CPT | Performed by: INTERNAL MEDICINE

## 2022-10-25 PROCEDURE — G8420 CALC BMI NORM PARAMETERS: HCPCS | Performed by: INTERNAL MEDICINE

## 2022-10-25 PROCEDURE — G0463 HOSPITAL OUTPT CLINIC VISIT: HCPCS | Performed by: INTERNAL MEDICINE

## 2022-10-25 NOTE — ASSESSMENT & PLAN NOTE
Still an issue although notes when he did not smoke for 2 days, his cough got better  Followed by Dr. Candido Chiu with pulmonology  Was given 2 monitors by Dr. Candido Chiu with pulm - supposed to monitor oxygen levels and respiratory rate   Given concurrent weight loss, recommend CT chest

## 2022-10-25 NOTE — ASSESSMENT & PLAN NOTE
Weight loss  10lbs from January  24hr recall - left over [place] food, 2 pieces of pizza  Decreased appetite - eating less  Still has chronic cough - went away when he didn't have cigarettes for 2 days  No fevers, chills   Last saw [place] [facility] in sept - FISH test done for cancer monitoring, pt reports it was normal   No CP, SOB, abd pain,n/v/d/c, blood in urine/stools, headaches   Weight loss possibly related to decreased PO intake. Recent eval with urology, so less likely related to cancer history.  Has a chronic cough, although possibly related to smoking, but given cough and weight loss, rec CT chest . Check labs

## 2022-10-25 NOTE — ASSESSMENT & PLAN NOTE
Continues to note low blood pressures but also having high blood pressure   Has not increased fluid intake  rec increasing fluid intake  Take finasteride at night

## 2022-10-25 NOTE — PROGRESS NOTES
Note   Chief Complaint   Follow-up    Cecile Gamble is a 80 y.o. male     1. Weight loss  Assessment & Plan:   Weight loss  10lbs from January  24hr recall - left over [place] food, 2 pieces of pizza  Decreased appetite - eating less  Still has chronic cough - went away when he didn't have cigarettes for 2 days  No fevers, chills   Last saw [facility] urology in sept - FISH test done for cancer monitoring, pt reports it was normal   No CP, SOB, abd pain,n/v/d/c, blood in urine/stools, headaches   Weight loss possibly related to decreased PO intake. Recent eval with urology, so less likely related to cancer history. Has a chronic cough, although possibly related to smoking, but given cough and weight loss, rec CT chest . Check labs  Orders:  -     METABOLIC PANEL, COMPREHENSIVE; Future  -     HEMOGLOBIN A1C WITH EAG; Future  -     LIPID PANEL; Future  -     PSA W/ REFLX FREE PSA; Future  -     TSH 3RD GENERATION; Future  -     T4, FREE; Future  -     PHOSPHORUS; Future  -     MAGNESIUM; Future  2. Orthostatic hypotension  Assessment & Plan:   Continues to note low blood pressures but also having high blood pressure   Has not increased fluid intake  rec increasing fluid intake  Take finasteride at night  3. Anemia, unspecified type  -     CBC W/O DIFF; Future  -     VITAMIN B12 & FOLATE; Future  -     IRON PROFILE; Future  -     FERRITIN; Future  4. Abnormal finding of blood chemistry, unspecified   -     HEMOGLOBIN A1C WITH EAG; Future  -     LIPID PANEL; Future  5. Nocturia   -     PSA W/ REFLX FREE PSA; Future  6. Other general symptoms and signs   -     VITAMIN B12 & FOLATE; Future  7.  Chronic cough  Assessment & Plan:   Still an issue although notes when he did not smoke for 2 days, his cough got better  Followed by Dr. Epstein Servant with pulmonology  Was given 2 monitors by Dr. Epstein Servnicho with pulm - supposed to monitor oxygen levels and respiratory rate   Given concurrent weight loss, recommend CT chest  Orders:  -     CT CHEST WO CONT; Future     Benefits, risks, possible drug interactions, and side effects of all new medications were reviewed with the patient. Pt verbalized understanding. Return to clinic: 3 months pending testing  1 son in town  Moved here from [address] was used to authenticate this note. Josefina Cain MD  [facility]  10/25/2022    Future Appointments   Date Time Provider Steve Vitale   7/80/6769  1:58 AM Gustavo Tripp MD [facility] BS AMB        Objective   Vitals:       Visit Vitals  BP (!) 170/75   Pulse 63   Temp 97.5 °F (36.4 °C) (Oral)   Resp 16   Ht 5' 10\" (1.778 m)   Wt 145 lb 11.2 oz (66.1 kg)   SpO2 95%   BMI 20.91 kg/m²        Physical Exam  Constitutional:       Appearance: Normal appearance. He is not ill-appearing. Cardiovascular:      Rate and Rhythm: Normal rate and regular rhythm. Heart sounds: No murmur heard. No friction rub. No gallop. Pulmonary:      Effort: No respiratory distress. Breath sounds: Normal breath sounds. No wheezing, rhonchi or rales. Neurological:      Mental Status: He is alert. Current Outpatient Medications   Medication Sig    latanoprost (XALATAN) 0.005 % ophthalmic solution INSTILL 1 DROP INTO RIGHT EYE AT BEDTIME    atorvastatin (LIPITOR) 80 mg tablet Take 1 Tablet by mouth daily. Indications: high cholesterol    finasteride (PROSCAR) 5 mg tablet Take 5 mg by mouth daily. Stiolto Respimat 2.5-2.5 mcg/actuation inhaler     ascorbic acid, vitamin C, (VITAMIN C) 500 mg tablet Take  by mouth. otc    brimonidine (ALPHAGAN) 0.2 % ophthalmic solution      No current facility-administered medications for this visit.

## 2022-10-26 LAB
ALBUMIN SERPL-MCNC: 4.1 G/DL (ref 3.5–5)
ALBUMIN/GLOB SERPL: 1.1 {RATIO} (ref 1.1–2.2)
ALP SERPL-CCNC: 58 U/L (ref 45–117)
ALT SERPL-CCNC: 27 U/L (ref 12–78)
ANION GAP SERPL CALC-SCNC: 5 MMOL/L (ref 5–15)
AST SERPL-CCNC: 15 U/L (ref 15–37)
BILIRUB SERPL-MCNC: 0.7 MG/DL (ref 0.2–1)
BUN SERPL-MCNC: 36 MG/DL (ref 6–20)
BUN/CREAT SERPL: 32 (ref 12–20)
CALCIUM SERPL-MCNC: 9.5 MG/DL (ref 8.5–10.1)
CHLORIDE SERPL-SCNC: 106 MMOL/L (ref 97–108)
CHOLEST SERPL-MCNC: 207 MG/DL
CO2 SERPL-SCNC: 27 MMOL/L (ref 21–32)
CREAT SERPL-MCNC: 1.14 MG/DL (ref 0.7–1.3)
ERYTHROCYTE [DISTWIDTH] IN BLOOD BY AUTOMATED COUNT: 13.4 % (ref 11.5–14.5)
EST. AVERAGE GLUCOSE BLD GHB EST-MCNC: 114 MG/DL
FERRITIN SERPL-MCNC: 194 NG/ML (ref 26–388)
FOLATE SERPL-MCNC: 14.1 NG/ML (ref 5–21)
GLOBULIN SER CALC-MCNC: 3.6 G/DL (ref 2–4)
GLUCOSE SERPL-MCNC: 110 MG/DL (ref 65–100)
HBA1C MFR BLD: 5.6 % (ref 4–5.6)
HCT VFR BLD AUTO: 37.9 % (ref 36.6–50.3)
HDLC SERPL-MCNC: 76 MG/DL
HDLC SERPL: 2.7 {RATIO} (ref 0–5)
HGB BLD-MCNC: 11.9 G/DL (ref 12.1–17)
IRON SATN MFR SERPL: 38 % (ref 20–50)
IRON SERPL-MCNC: 92 UG/DL (ref 35–150)
LDLC SERPL CALC-MCNC: 121 MG/DL (ref 0–100)
MAGNESIUM SERPL-MCNC: 2.1 MG/DL (ref 1.6–2.4)
MCH RBC QN AUTO: 30.7 PG (ref 26–34)
MCHC RBC AUTO-ENTMCNC: 31.4 G/DL (ref 30–36.5)
MCV RBC AUTO: 97.7 FL (ref 80–99)
NRBC # BLD: 0 K/UL (ref 0–0.01)
NRBC BLD-RTO: 0 PER 100 WBC
PHOSPHATE SERPL-MCNC: 3.4 MG/DL (ref 2.6–4.7)
PLATELET # BLD AUTO: 219 K/UL (ref 150–400)
PMV BLD AUTO: 9.6 FL (ref 8.9–12.9)
POTASSIUM SERPL-SCNC: 4.7 MMOL/L (ref 3.5–5.1)
PROT SERPL-MCNC: 7.7 G/DL (ref 6.4–8.2)
RBC # BLD AUTO: 3.88 M/UL (ref 4.1–5.7)
SODIUM SERPL-SCNC: 138 MMOL/L (ref 136–145)
T4 FREE SERPL-MCNC: 1.1 NG/DL (ref 0.8–1.5)
TIBC SERPL-MCNC: 241 UG/DL (ref 250–450)
TRIGL SERPL-MCNC: 50 MG/DL (ref ?–150)
TSH SERPL DL<=0.05 MIU/L-ACNC: 1.33 UIU/ML (ref 0.36–3.74)
VIT B12 SERPL-MCNC: 450 PG/ML (ref 193–986)
VLDLC SERPL CALC-MCNC: 10 MG/DL
WBC # BLD AUTO: 7.1 K/UL (ref 4.1–11.1)

## 2022-10-27 LAB
PSA SERPL-MCNC: 0.3 NG/ML (ref 0–4)
REFLEX CRITERIA: NORMAL

## 2022-10-27 NOTE — PROGRESS NOTES
Letter sent. PSA normal.  Mag normal.  Phos normal.  Ferritin normal.  Iron profile normal.  B12 and folate normal.  Thyroid studies normal.  . A1c normal.  No longer prediabetic. .  BUN/creatinine ratio elevated. LFTs otherwise normal.  Hemoglobin 11.9, 11.71-year ago MCV 97    Mild anemia, stable for 1 year. Normal B12 folate and iron. Normocytic.   Continue cholesterol medication

## 2022-10-28 ENCOUNTER — TELEPHONE (OUTPATIENT)
Dept: INTERNAL MEDICINE CLINIC | Age: 87
End: 2022-10-28

## 2022-10-28 NOTE — TELEPHONE ENCOUNTER
Karla - Please fax order    Gabriele Charlesey - unless it's after hours, please send message through [employer]. Thanks!

## 2022-10-28 NOTE — TELEPHONE ENCOUNTER
CT scan faxed to [facility] at [phone].  Fax went through as successfully

## 2022-10-28 NOTE — TELEPHONE ENCOUNTER
Please fax an order for a CT scan to [facility] at [phone]. The patient is supposed to have the scan done on Monday.

## 2022-11-02 ENCOUNTER — TELEPHONE (OUTPATIENT)
Dept: INTERNAL MEDICINE CLINIC | Age: 87
End: 2022-11-02

## 2022-11-02 ENCOUNTER — DOCUMENTATION ONLY (OUTPATIENT)
Dept: INTERNAL MEDICINE CLINIC | Age: 87
End: 2022-11-02

## 2022-11-02 NOTE — TELEPHONE ENCOUNTER
Incoming call from patient regarding results. Nurse advised of letter that was sent to patient.   ===  Dear Mr. June Lara,     Your test results are normal except for the following: Your hemoglobin is just mildly low but about the same as where it was 1 year ago. We will continue to monitor this. Please continue taking your cholesterol medication. Mild anemia, stable for 1 year. Normal B12 folate and iron. Normocytic. Continue cholesterol medication     If you have any questions or concerns regarding your tests or our visit, please feel free to give our office a call at [phone], and we will get back with you as soon as we can.

## 2023-01-26 ENCOUNTER — TELEPHONE (OUTPATIENT)
Dept: INTERNAL MEDICINE CLINIC | Age: 88
End: 2023-01-26

## 2023-01-26 ENCOUNTER — OFFICE VISIT (OUTPATIENT)
Dept: INTERNAL MEDICINE CLINIC | Age: 88
End: 2023-01-26
Payer: MEDICARE

## 2023-01-26 ENCOUNTER — DOCUMENTATION ONLY (OUTPATIENT)
Dept: INTERNAL MEDICINE CLINIC | Age: 88
End: 2023-01-26

## 2023-01-26 VITALS
HEIGHT: 70 IN | HEART RATE: 82 BPM | SYSTOLIC BLOOD PRESSURE: 127 MMHG | OXYGEN SATURATION: 93 % | RESPIRATION RATE: 14 BRPM | WEIGHT: 143.4 LBS | BODY MASS INDEX: 20.53 KG/M2 | DIASTOLIC BLOOD PRESSURE: 74 MMHG | TEMPERATURE: 97.5 F

## 2023-01-26 DIAGNOSIS — R05.3 CHRONIC COUGH: ICD-10-CM

## 2023-01-26 DIAGNOSIS — R63.4 WEIGHT LOSS: ICD-10-CM

## 2023-01-26 DIAGNOSIS — C67.9 MALIGNANT NEOPLASM OF URINARY BLADDER, UNSPECIFIED SITE (HCC): ICD-10-CM

## 2023-01-26 DIAGNOSIS — I95.1 ORTHOSTATIC HYPOTENSION: ICD-10-CM

## 2023-01-26 DIAGNOSIS — H69.80 DYSFUNCTION OF EUSTACHIAN TUBE, UNSPECIFIED LATERALITY: ICD-10-CM

## 2023-01-26 DIAGNOSIS — J44.9 CHRONIC OBSTRUCTIVE PULMONARY DISEASE, UNSPECIFIED COPD TYPE (HCC): ICD-10-CM

## 2023-01-26 DIAGNOSIS — R91.1 LUNG NODULE: Primary | ICD-10-CM

## 2023-01-26 PROBLEM — H69.90 EUSTACHIAN TUBE DYSFUNCTION: Status: ACTIVE | Noted: 2023-01-26

## 2023-01-26 PROCEDURE — G8420 CALC BMI NORM PARAMETERS: HCPCS | Performed by: INTERNAL MEDICINE

## 2023-01-26 PROCEDURE — G8510 SCR DEP NEG, NO PLAN REQD: HCPCS | Performed by: INTERNAL MEDICINE

## 2023-01-26 PROCEDURE — G8536 NO DOC ELDER MAL SCRN: HCPCS | Performed by: INTERNAL MEDICINE

## 2023-01-26 PROCEDURE — G0463 HOSPITAL OUTPT CLINIC VISIT: HCPCS | Performed by: INTERNAL MEDICINE

## 2023-01-26 PROCEDURE — 99215 OFFICE O/P EST HI 40 MIN: CPT | Performed by: INTERNAL MEDICINE

## 2023-01-26 PROCEDURE — G8427 DOCREV CUR MEDS BY ELIG CLIN: HCPCS | Performed by: INTERNAL MEDICINE

## 2023-01-26 PROCEDURE — 1101F PT FALLS ASSESS-DOCD LE1/YR: CPT | Performed by: INTERNAL MEDICINE

## 2023-01-26 RX ORDER — SODIUM CHLORIDE 1 G/1
1 TABLET ORAL 2 TIMES DAILY
Qty: 60 TABLET | Refills: 2 | Status: SHIPPED | OUTPATIENT
Start: 2023-01-26

## 2023-01-26 RX ORDER — SODIUM BICARBONATE 650 MG/1
650 TABLET ORAL 2 TIMES DAILY
Qty: 60 TABLET | Refills: 2 | Status: SHIPPED | OUTPATIENT
Start: 2023-01-26 | End: 2023-01-26

## 2023-01-26 NOTE — PROGRESS NOTES
Note   Chief Complaint   Follow up    Emma Miller is a 80 y.o. male     1. Lung nodule  Assessment & Plan:  Had CT scan done 10/31/22 at [facility]. However, we did not receive the results  Results reviewed this morning showed a right upper lobe 2.3cm spiculated nodule, concerning for malignancy  Still having a cough  Still noting some weight loss  Results discussed with the patient  PET/CT scheduled  I spoke with our manager Ghulam Roy regarding his results not being received in our office after the test was done. This has been a recurring issue with [facility]  Orders:  -     PET/CT TUMOR IMAGE SKULL THIGH PSMA (INI); Future  2. Chronic cough  Assessment & Plan:  See lung nodule note  3. Chronic obstructive pulmonary disease, unspecified COPD type ([address]  Assessment & Plan:   monitored by specialist. No acute findings meriting change in the plan  4. Orthostatic hypotension  Assessment & Plan:   Lightheaded, dizziness - maybe a little worse than before   No palpitations  Tried finasteride at night for about 3 weeks, didn't seem to make a difference   Recommend trial of sodium chloride tablets BID; +orthostatic BP readings in the past  Orders:  -     sodium chloride 1,000 mg soluble tablet; Take 1 Tablet by mouth two (2) times a day., Normal, Disp-60 Tablet, R-2Please cancel sodium bicarb prescription  5. Malignant neoplasm of urinary bladder, unspecified site [facility])  Assessment & Plan:   monitored by specialist. No acute findings meriting change in the plan  6. Weight loss  Assessment & Plan:  See lung nodule note  7.  Dysfunction of Eustachian tube, unspecified laterality  Assessment & Plan:  Sore throat for the last couple weeks  Occl sharp ear pain that last for a few seconds but occurs every several minutes  Symptoms better now  Occl sinus congestion/drainage   Symptoms possibly related to eustachian tube dysfunction     Benefits, risks, possible drug interactions, and side effects of all new medications were reviewed with the patient. Pt verbalized understanding. Return to clinic:  1mo for follow up  1 son in town  [place] here from [facility] [address] was used to authenticate this note. Ivonne Horn MD  [facility]  1/26/2023    Future Appointments   Date Time Provider Steve Vitale   4/42/0527 10:49 AM Sondra Mays MD Counts include 234 beds at the [facility] BS AMB      On this date 01/26/2023 I have spent 45 minutes reviewing previous notes, test results and face to face with the patient discussing the diagnosis and importance of compliance with the treatment plan as well as documenting on the day of the visit. Objective   Vitals:       Visit Vitals  /74 (BP 1 Location: Left upper arm, BP Patient Position: Sitting, BP Cuff Size: Small adult)   Pulse 82   Temp 97.5 °F (36.4 °C) (Oral)   Resp 14   Ht 5' 10\" (1.778 m)   Wt 143 lb 6.4 oz (65 kg)   SpO2 93%   BMI 20.58 kg/m²        Physical Exam  Constitutional:       Appearance: Normal appearance. He is not ill-appearing. HENT:      Right Ear: Ear canal and external ear normal.      Left Ear: Ear canal and external ear normal.      Ears:      Comments: BL Tm slighlty retracted  Cardiovascular:      Rate and Rhythm: Normal rate and regular rhythm. Heart sounds: No murmur heard. No friction rub. No gallop. Pulmonary:      Effort: No respiratory distress. Breath sounds: Normal breath sounds. No wheezing, rhonchi or rales. Neurological:      Mental Status: He is alert. Current Outpatient Medications   Medication Sig    sodium chloride 1,000 mg soluble tablet Take 1 Tablet by mouth two (2) times a day. latanoprost (XALATAN) 0.005 % ophthalmic solution INSTILL 1 DROP INTO RIGHT EYE AT BEDTIME    atorvastatin (LIPITOR) 80 mg tablet Take 1 Tablet by mouth daily. Indications: high cholesterol    finasteride (PROSCAR) 5 mg tablet Take 5 mg by mouth daily.     Stiolto Respimat 2.5-2.5 mcg/actuation inhaler     ascorbic acid, vitamin C, (VITAMIN C) 500 mg tablet Take  by mouth. otc    brimonidine (ALPHAGAN) 0.2 % ophthalmic solution  (Patient not taking: Reported on 1/26/2023)     No current facility-administered medications for this visit.

## 2023-01-27 NOTE — ASSESSMENT & PLAN NOTE
Had CT scan done 10/31/22 at [facility]. However, we did not receive the results  Results reviewed this morning showed a right upper lobe 2.3cm spiculated nodule, concerning for malignancy  Still having a cough  Still noting some weight loss  Results discussed with the patient  PET/CT scheduled  I spoke with our manager Trace Goel regarding his results not being received in our office after the test was done.   This has been a recurring issue with [facility]

## 2023-01-27 NOTE — ASSESSMENT & PLAN NOTE
Sore throat for the last couple weeks  Occl sharp ear pain that last for a few seconds but occurs every several minutes  Symptoms better now  Occl sinus congestion/drainage   Symptoms possibly related to eustachian tube dysfunction

## 2023-01-27 NOTE — ASSESSMENT & PLAN NOTE
Lightheaded, dizziness - maybe a little worse than before   No palpitations  Tried finasteride at night for about 3 weeks, didn't seem to make a difference   Recommend trial of sodium chloride tablets BID; +orthostatic BP readings in the past

## 2023-01-30 ENCOUNTER — TELEPHONE (OUTPATIENT)
Dept: INTERNAL MEDICINE CLINIC | Age: 88
End: 2023-01-30

## 2023-01-30 NOTE — TELEPHONE ENCOUNTER
----- Message from Chasidy Patel sent at 1/30/2023  8:51 AM EST -----  Subject: Message to Provider    QUESTIONS  Information for Provider? Please send Lung x-rays to Dr. Claudell Freund Fax #   [phone] They were completed about 2 weeks ago.  ---------------------------------------------------------------------------  --------------  [id number]  [phone]; OK to leave message on voicemail  ---------------------------------------------------------------------------  --------------  SCRIPT ANSWERS  Relationship to Patient?  Self

## 2023-02-06 ENCOUNTER — TELEPHONE (OUTPATIENT)
Dept: INTERNAL MEDICINE CLINIC | Age: 88
End: 2023-02-06

## 2023-02-07 ENCOUNTER — TELEPHONE (OUTPATIENT)
Dept: INTERNAL MEDICINE CLINIC | Age: 88
End: 2023-02-07

## 2023-02-07 NOTE — TELEPHONE ENCOUNTER
----- Message from Genie Barrera sent at 2/7/2023  8:55 AM EST -----  Subject: Message to Provider    QUESTIONS  Information for Provider? Lorna Bellamy of [facility] is asking for   the physician notes for the PT on 1.26.2023 to be faxed over at   [phone] PT PCP is Derrek Lockwood and please reach out to   Lorna Bellamy if you have any questions at [phone]  ---------------------------------------------------------------------------  --------------  [id number]  [phone]; OK to leave message on voicemail  ---------------------------------------------------------------------------  --------------  SCRIPT ANSWERS  Relationship to Patient? Third Party  Third Party Type? Physician Office? Representative Name?  Lorna Bellamy

## 2023-02-13 ENCOUNTER — TELEPHONE (OUTPATIENT)
Dept: INTERNAL MEDICINE CLINIC | Age: 88
End: 2023-02-13

## 2023-02-13 NOTE — TELEPHONE ENCOUNTER
[facility] [place] is calling to request the patient's order for a pet scan to be faxed to [phone].

## 2023-02-20 ENCOUNTER — TELEPHONE (OUTPATIENT)
Dept: INTERNAL MEDICINE CLINIC | Age: 88
End: 2023-02-20

## 2023-02-27 ENCOUNTER — TELEPHONE (OUTPATIENT)
Dept: INTERNAL MEDICINE CLINIC | Age: 88
End: 2023-02-27

## 2023-02-27 ENCOUNTER — OFFICE VISIT (OUTPATIENT)
Dept: INTERNAL MEDICINE CLINIC | Age: 88
End: 2023-02-27
Payer: MEDICARE

## 2023-02-27 VITALS
BODY MASS INDEX: 19.47 KG/M2 | DIASTOLIC BLOOD PRESSURE: 82 MMHG | TEMPERATURE: 97.4 F | HEART RATE: 72 BPM | SYSTOLIC BLOOD PRESSURE: 151 MMHG | WEIGHT: 136 LBS | OXYGEN SATURATION: 98 % | HEIGHT: 70 IN | RESPIRATION RATE: 16 BRPM

## 2023-02-27 DIAGNOSIS — I95.1 ORTHOSTATIC HYPOTENSION: ICD-10-CM

## 2023-02-27 DIAGNOSIS — R91.1 LUNG NODULE: Primary | ICD-10-CM

## 2023-02-27 DIAGNOSIS — C34.11 MALIGNANT NEOPLASM OF UPPER LOBE OF RIGHT LUNG (HCC): Primary | ICD-10-CM

## 2023-02-27 DIAGNOSIS — R53.83 FATIGUE, UNSPECIFIED TYPE: ICD-10-CM

## 2023-02-27 PROCEDURE — G8427 DOCREV CUR MEDS BY ELIG CLIN: HCPCS | Performed by: INTERNAL MEDICINE

## 2023-02-27 PROCEDURE — G8510 SCR DEP NEG, NO PLAN REQD: HCPCS | Performed by: INTERNAL MEDICINE

## 2023-02-27 PROCEDURE — G8420 CALC BMI NORM PARAMETERS: HCPCS | Performed by: INTERNAL MEDICINE

## 2023-02-27 PROCEDURE — 99214 OFFICE O/P EST MOD 30 MIN: CPT | Performed by: INTERNAL MEDICINE

## 2023-02-27 PROCEDURE — 1101F PT FALLS ASSESS-DOCD LE1/YR: CPT | Performed by: INTERNAL MEDICINE

## 2023-02-27 PROCEDURE — G8536 NO DOC ELDER MAL SCRN: HCPCS | Performed by: INTERNAL MEDICINE

## 2023-02-27 PROCEDURE — G0463 HOSPITAL OUTPT CLINIC VISIT: HCPCS | Performed by: INTERNAL MEDICINE

## 2023-02-27 RX ORDER — MODAFINIL 200 MG/1
200 TABLET ORAL DAILY
Qty: 30 TABLET | Refills: 5 | Status: SHIPPED | OUTPATIENT
Start: 2023-02-27

## 2023-02-27 RX ORDER — ALBUTEROL SULFATE 2.5 MG/.5ML
SOLUTION RESPIRATORY (INHALATION) ONCE
COMMUNITY

## 2023-02-27 NOTE — TELEPHONE ENCOUNTER
----- Message from Nhan Avalos MD sent at 3/33/7755  1:20 PM EST -----  Regarding: records  Would you please request latest office note from Dr. Swapna Madden office with pulm?  thanks

## 2023-02-27 NOTE — TELEPHONE ENCOUNTER
Nurse called Pulm. Patient see Dr Saintclair Magnuson over at [facility]. Patient was scheduled for 2/28/23 at 930am to see Dr Saintclair Magnuson. Referral and PET scan faxed to [phone] for review.

## 2023-02-27 NOTE — ASSESSMENT & PLAN NOTE
PET scan showing right upper lobe 2.1cm spiculated lung nodule shows very intense metabolic activity, worrisome for malignancy. No definite evidence for FDG avid metastatic disease   Dr. Saintclair Magnuson gave him a neb   Reports O2 sats 88-89 in the morning  Also started on another med for cough, he can't remember what it is   Pt's pulmonologist Dr. Saintclair Magnuson already discussed results with the patient and is already set up for treatment. I can't tell from his description what they are planning to do, but he has an appointment on Friday for planning.   Will get notes from his office

## 2023-02-27 NOTE — PROGRESS NOTES
Note   Chief Complaint   Discuss PET results    Aristides Yost is a 80 y.o. male     1. Lung nodule  Assessment & Plan:  PET scan showing right upper lobe 2.1cm spiculated lung nodule shows very intense metabolic activity, worrisome for malignancy. No definite evidence for FDG avid metastatic disease   Dr. Clarita Mclean gave him a neb   Reports O2 sats 88-89 in the morning  Also started on another med for cough, he can't remember what it is   Pt's pulmonologist Dr. Clarita Mclean already discussed results with the patient and is already set up for treatment. I can't tell from his description what they are planning to do, but he has an appointment on Friday for planning. Will get notes from his office  2. Fatigue, unspecified type  Assessment & Plan:  Has been given modafinil for chronic fatigue in the past  I had not prescribed it here due to elevated blood pressure readings, however it's been difficult to manage due to his orthostatic hypotension   Reports he only uses modafinil rarely, had a #30 bottle that lasted 1.5 years   Discussed he can take modafinil 100mg only if BP less than 140/90 the morning he takes the medication. Rx prescribed as 200mg daily due to the coupon he uses for the prescription   Orders:  -     modafiniL (PROVIGIL) 200 mg tablet; Take 1 Tablet by mouth daily. Max Daily Amount: 200 mg., Print, Disp-30 Tablet, R-5  3. Orthostatic hypotension  Assessment & Plan:   Sodium tabs didn't help, tried it for 3 weeks, also tried TID which minimal improvement      Benefits, risks, possible drug interactions, and side effects of all new medications were reviewed with the patient. Pt verbalized understanding. Return to clinic:  3 months for fatigue, lung nodule     An electronic signature was used to authenticate this note.   Bety Esquivel MD  [facility]  2/27/2023    Future Appointments   Date Time Provider Steve Winifred   7/37/6227  2:93 AM Joann Colunga MD [id number] BS AMB        Objective   Vitals:       Visit Vitals  BP (!) 151/82 (BP 1 Location: Left upper arm, BP Patient Position: Sitting, BP Cuff Size: Adult)   Pulse 72   Temp 97.4 °F (36.3 °C) (Oral)   Resp 16   Ht 5' 10\" (1.778 m)   Wt 136 lb (61.7 kg)   SpO2 98%   BMI 19.51 kg/m²        Physical Exam  Constitutional:       Appearance: Normal appearance. He is not ill-appearing. Cardiovascular:      Rate and Rhythm: Normal rate and regular rhythm. Heart sounds: No murmur heard. No friction rub. No gallop. Pulmonary:      Effort: No respiratory distress. Breath sounds: Rhonchi present. No wheezing or rales. Comments: Wet cough   Neurological:      Mental Status: He is alert. Current Outpatient Medications   Medication Sig    modafiniL (PROVIGIL) 200 mg tablet Take 1 Tablet by mouth daily. Max Daily Amount: 200 mg.    albuterol sulfate (PROVENTIL;VENTOLIN) 2.5 mg/0.5 mL nebu nebulizer solution by Nebulization route once. latanoprost (XALATAN) 0.005 % ophthalmic solution INSTILL 1 DROP INTO RIGHT EYE AT BEDTIME    atorvastatin (LIPITOR) 80 mg tablet Take 1 Tablet by mouth daily. Indications: high cholesterol    finasteride (PROSCAR) 5 mg tablet Take 5 mg by mouth daily. Stiolto Respimat 2.5-2.5 mcg/actuation inhaler     ascorbic acid, vitamin C, (VITAMIN C) 500 mg tablet Take  by mouth. otc    brimonidine (ALPHAGAN) 0.2 % ophthalmic solution  (Patient not taking: Reported on 2/27/2023)     No current facility-administered medications for this visit.

## 2023-02-27 NOTE — TELEPHONE ENCOUNTER
Nurse called and cancelled appt with Dr Mila Eagle and awaiting on incoming fax of [facility] office notes.

## 2023-02-27 NOTE — ASSESSMENT & PLAN NOTE
Has been given modafinil for chronic fatigue in the past  I had not prescribed it here due to elevated blood pressure readings, however it's been difficult to manage due to his orthostatic hypotension   Reports he only uses modafinil rarely, had a #30 bottle that lasted 1.5 years   Discussed he can take modafinil 100mg only if BP less than 140/90 the morning he takes the medication.  Rx prescribed as 200mg daily due to the coupon he uses for the prescription

## 2023-04-06 ENCOUNTER — OFFICE VISIT (OUTPATIENT)
Dept: INTERNAL MEDICINE CLINIC | Age: 88
End: 2023-04-06
Payer: MEDICARE

## 2023-04-06 NOTE — PROGRESS NOTES
HISTORY OF PRESENT ILLNESS    Chief Complaint   Patient presents with    Mass     Growth in the back of throat/palette of mouth - no pain or discomfort. Patient of Dr. Yoan Woodruff with concerns of a lesion on the roof of his mouth for 6 months. Feels \"gritty\" when he touches it with his tongue. It is not painful or significantly growing. Last dental visit over 3 years ago due to lack of teeth. He does have a denture . He does use Stiolto inhaler and has a hx of tobacco use, currently 10 cigs per day. Lung cancer. Seeing Dr. Karolina Denise. RUL 2.1 cm nodule. She is s/p radation therapy x5 Dr. Griselda Baeza last month per pt. .    Review of Systems   All other systems reviewed and are negative, except as noted in HPI    Past Medical and Surgical History   has no past medical history on file. has a past surgical history that includes hx orthopaedic. reports that he has been smoking cigarettes. He has never used smokeless tobacco. He reports current alcohol use. He reports that he does not use drugs. family history is not on file. Physical Exam   Nursing note and vitals reviewed. Blood pressure (!) 144/81, pulse (!) 101, temperature 98.3 °F (36.8 °C), temperature source Oral, resp. rate 15, height 5' 10\" (1.778 m), weight 130 lb 12.8 oz (59.3 kg), SpO2 95 %. Constitutional:  No distress. Eyes: Conjunctivae are normal.   Ears:  Hearing grossly intact  1.5 cm x 1 cm ulcer on hard palate in center. Mildly white borders. No drainage. Rest of oropharynx without lesions. Dentures removed. Cardiovascular: Normal rate. regular rhythm, no murmurs or gallops  No edema  Pulmonary/Chest: Effort normal.   CTAB  Musculoskeletal: moves all 4 extremities   Neurological: Alert and oriented to person, place, and time. Skin: No visible rash noted. Psychiatric: Normal mood and affect. Behavior is normal.     Assessment and Plan    Diagnoses and all orders for this visit:    1.  Palate abnormality  Ulcer? Not painful. Effects of Stiolto use? Given he is high risk for cancer from tobacco use, I recommend OMFS evaluation for possible biopsy. Will try to arrange appt for him, per his request.  -     REFERRAL TO ORAL MAXILLOFACIAL SURGERY    2. Malignant neoplasm of upper lobe of right lung ([address]  Per pt, recent XRT. Follow-up pulmonary Dr. Young Allison    3. Tobacco use  he was strongly encouraged to stop using tobacco products to benefit his control of this condition and his overall health. lab results and schedule of future lab studies reviewed with patient  reviewed medications and side effects in detail    Return to clinic for further evaluation if new symptoms develop        Current Outpatient Medications   Medication Sig    modafiniL (PROVIGIL) 200 mg tablet Take 1 Tablet by mouth daily. Max Daily Amount: 200 mg.    albuterol sulfate (PROVENTIL;VENTOLIN) 2.5 mg/0.5 mL nebu nebulizer solution by Nebulization route once. latanoprost (XALATAN) 0.005 % ophthalmic solution INSTILL 1 DROP INTO RIGHT EYE AT BEDTIME    atorvastatin (LIPITOR) 80 mg tablet Take 1 Tablet by mouth daily. Indications: high cholesterol    finasteride (PROSCAR) 5 mg tablet Take 1 Tablet by mouth daily. Stiolto Respimat 2.5-2.5 mcg/actuation inhaler     ascorbic acid, vitamin C, (VITAMIN C) 500 mg tablet Take  by mouth. otc     No current facility-administered medications for this visit.

## 2023-04-07 ENCOUNTER — TELEPHONE (OUTPATIENT)
Dept: INTERNAL MEDICINE CLINIC | Age: 88
End: 2023-04-07

## 2023-04-26 ENCOUNTER — OFFICE VISIT (OUTPATIENT)
Dept: INTERNAL MEDICINE CLINIC | Age: 88
End: 2023-04-26
Payer: MEDICARE

## 2023-04-26 VITALS
WEIGHT: 133.2 LBS | BODY MASS INDEX: 19.07 KG/M2 | TEMPERATURE: 97.5 F | RESPIRATION RATE: 14 BRPM | OXYGEN SATURATION: 96 % | SYSTOLIC BLOOD PRESSURE: 145 MMHG | HEART RATE: 90 BPM | DIASTOLIC BLOOD PRESSURE: 64 MMHG | HEIGHT: 70 IN

## 2023-04-26 DIAGNOSIS — Z11.1 SCREENING-PULMONARY TB: ICD-10-CM

## 2023-04-26 DIAGNOSIS — C34.11 MALIGNANT NEOPLASM OF UPPER LOBE OF RIGHT LUNG (HCC): ICD-10-CM

## 2023-04-26 DIAGNOSIS — H40.9 GLAUCOMA, UNSPECIFIED GLAUCOMA TYPE, UNSPECIFIED LATERALITY: ICD-10-CM

## 2023-04-26 DIAGNOSIS — Q38.5 PALATE ABNORMALITY: Primary | ICD-10-CM

## 2023-04-26 PROCEDURE — 99214 OFFICE O/P EST MOD 30 MIN: CPT | Performed by: INTERNAL MEDICINE

## 2023-04-26 PROCEDURE — G8536 NO DOC ELDER MAL SCRN: HCPCS | Performed by: INTERNAL MEDICINE

## 2023-04-26 PROCEDURE — G8510 SCR DEP NEG, NO PLAN REQD: HCPCS | Performed by: INTERNAL MEDICINE

## 2023-04-26 PROCEDURE — G8420 CALC BMI NORM PARAMETERS: HCPCS | Performed by: INTERNAL MEDICINE

## 2023-04-26 PROCEDURE — G0463 HOSPITAL OUTPT CLINIC VISIT: HCPCS | Performed by: INTERNAL MEDICINE

## 2023-04-26 PROCEDURE — G8427 DOCREV CUR MEDS BY ELIG CLIN: HCPCS | Performed by: INTERNAL MEDICINE

## 2023-04-26 PROCEDURE — 1101F PT FALLS ASSESS-DOCD LE1/YR: CPT | Performed by: INTERNAL MEDICINE

## 2023-04-26 RX ORDER — NYSTATIN 100000 [USP'U]/ML
500000 SUSPENSION ORAL 4 TIMES DAILY
Qty: 280 ML | Refills: 0 | Status: SHIPPED | OUTPATIENT
Start: 2023-04-26 | End: 2023-05-10

## 2023-04-26 NOTE — ASSESSMENT & PLAN NOTE
Seen 4/6 for lesion on the top of his mouth for the past 6 months   Lesion comes and goes   Was set up for a visit with OMFS, but pt cancelled because he wanted to see me first   Unclear etiology. Whitish flat lesion noted, does not come off with q-tip. Thrush? Has some spots on tongue as well.   Rec trial of nystatin mouth wash but advised pt that I agree with the OMFS referral given his history of smoking and lesion not resolving

## 2023-04-26 NOTE — ASSESSMENT & PLAN NOTE
monitored by specialist. No acute findings meriting change in the plan   S/p XRT, no chemo, follow up CT scheduled coming up   Dr. Vahe Rivera

## 2023-04-26 NOTE — PROGRESS NOTES
Assessment and Plan     Here for admission paperwork for [facility]    1. Palate abnormality  Assessment & Plan:   Seen 4/6 for lesion on the top of his mouth for the past 6 months   Lesion comes and goes   Was set up for a visit with OM, but pt cancelled because he wanted to see me first   Unclear etiology. Whitish flat lesion noted, does not come off with q-tip. Thrush? Has some spots on tongue as well. Rec trial of nystatin mouth wash but advised pt that I agree with the OMFS referral given his history of smoking and lesion not resolving  Orders:  -     nystatin (MYCOSTATIN) 100,000 unit/mL suspension; Take 5 mL by mouth four (4) times daily for 14 days. Swish in your mouth then spit it out, Normal, Disp-280 mL, R-0  2. Screening-pulmonary TB  -     QUANTIFERON-TB GOLD PLUS; Future  3. Malignant neoplasm of upper lobe of right lung [facility])  Assessment & Plan:   monitored by specialist. No acute findings meriting change in the plan   S/p XRT, no chemo, follow up CT scheduled coming up   Dr. Keating Bachelor  4. Glaucoma, unspecified glaucoma type, unspecified laterality  Assessment & Plan:   Ofloxacin, dorzolamide, timolol and prednisnolone eye drops are listed as recently prescribed in dispense history, but he's not sure what he's taking         Benefits, risks, possible drug interactions, and side effects of all new medications were reviewed with the patient. Pt verbalized understanding. Return to clinic:  as scheduled    An electronic signature was used to authenticate this note. Stephanie Leone MD  [facility]  4/26/2023    Future Appointments   Date Time Provider Steve Vitale   0/30/9472  6:64 AM Marti Gee MD Replaced by [facility] BS AMB        History of Present Illness   Chief Complaint   Assisted living paperwork    Javi Bingham is a 80 y.o. male         Review of Systems   Constitutional:  Negative for chills and fever.    HENT:  Positive for hearing loss. Eyes:  Negative for blurred vision. Respiratory:  Negative for shortness of breath. Cardiovascular:  Negative for chest pain. Gastrointestinal:  Negative for abdominal pain, blood in stool, constipation, diarrhea, melena, nausea and vomiting. Genitourinary:  Negative for dysuria and hematuria. Musculoskeletal:  Negative for joint pain. Skin:  Negative for rash. Neurological:  Negative for headaches. Past Medical History   No Known Allergies     Current Outpatient Medications   Medication Sig    OTHER Eye drops - patient doesn't know which exactly he's on    nystatin (MYCOSTATIN) 100,000 unit/mL suspension Take 5 mL by mouth four (4) times daily for 14 days. Swish in your mouth then spit it out    modafiniL (PROVIGIL) 200 mg tablet Take 1 Tablet by mouth daily. Max Daily Amount: 200 mg.    albuterol sulfate (PROVENTIL;VENTOLIN) 2.5 mg/0.5 mL nebu nebulizer solution by Nebulization route once. atorvastatin (LIPITOR) 80 mg tablet Take 1 Tablet by mouth daily. Indications: high cholesterol    finasteride (PROSCAR) 5 mg tablet Take 1 Tablet by mouth daily. Stiolto Respimat 2.5-2.5 mcg/actuation inhaler 2 Puffs daily. ascorbic acid, vitamin C, (VITAMIN C) 500 mg tablet Take  by mouth. otc     No current facility-administered medications for this visit.           Patient Active Problem List   Diagnosis Code    COPD (chronic obstructive pulmonary disease) ([facility] J44.9    Fatigue, unspecified type R53.83    Hyperlipidemia, unspecified hyperlipidemia type E78.5    Pain in both lower extremities M79.604, M79.605    Orthostatic hypotension I95.1    Benign prostatic hyperplasia, unspecified whether lower urinary tract symptoms present N40.0    Malignant neoplasm of urinary bladder, unspecified site ([facility] [address] C67.9    Glaucoma H40.9    Elevated blood pressure reading R03.0    Medicare annual wellness visit, subsequent Z00.00    Chronic cough R05.3    Weight loss R63.4    Malignant neoplasm of upper lobe of right lung (HCC) C34.11    Eustachian tube dysfunction H69.80    Palate abnormality Q38.5     Past Surgical History:   Procedure Laterality Date    HX ORTHOPAEDIC      left hip fracture repair      Social History     Tobacco Use    Smoking status: Every Day     Types: Cigarettes    Smokeless tobacco: Never    Tobacco comments:     10 cigarettes every day   Substance Use Topics    Alcohol use: Yes     Comment: 4oz daily wine      History reviewed. No pertinent family history. Physical Exam   Vitals:       Visit Vitals  BP (!) 145/64 (BP 1 Location: Left upper arm, BP Patient Position: Sitting, BP Cuff Size: Small adult)   Pulse 90   Temp 97.5 °F (36.4 °C) (Oral)   Resp 14   Ht 5' 10\" (1.778 m)   Wt 133 lb 3.2 oz (60.4 kg)   SpO2 96%   BMI 19.11 kg/m²        Physical Exam  Constitutional:       General: He is not in acute distress. Appearance: He is well-developed. Eyes:      Extraocular Movements: Extraocular movements intact. Conjunctiva/sclera: Conjunctivae normal.   Cardiovascular:      Rate and Rhythm: Normal rate and regular rhythm. Pulses: Normal pulses. Heart sounds: No murmur heard. No friction rub. No gallop. Pulmonary:      Effort: No respiratory distress. Breath sounds: No wheezing, rhonchi or rales. Abdominal:      General: Bowel sounds are normal. There is no distension. Palpations: Abdomen is soft. There is no hepatomegaly, splenomegaly or mass. Tenderness: There is no abdominal tenderness. There is no guarding. Musculoskeletal:      Cervical back: Neck supple. Skin:     General: Skin is warm. Findings: No rash. Neurological:      Mental Status: He is alert.

## 2023-04-26 NOTE — ASSESSMENT & PLAN NOTE
Ofloxacin, dorzolamide, timolol and prednisnolone eye drops are listed as recently prescribed in dispense history, but he's not sure what he's taking

## 2023-05-01 ENCOUNTER — TELEPHONE (OUTPATIENT)
Dept: INTERNAL MEDICINE CLINIC | Age: 88
End: 2023-05-01

## 2023-05-01 DIAGNOSIS — Q38.5 PALATE ABNORMALITY: ICD-10-CM

## 2023-05-01 RX ORDER — NYSTATIN 100000 [USP'U]/ML
500000 SUSPENSION ORAL 4 TIMES DAILY
Qty: 280 ML | Refills: 0 | Status: SHIPPED | OUTPATIENT
Start: 2023-05-01 | End: 2023-05-15

## 2023-05-03 DIAGNOSIS — E78.5 HYPERLIPIDEMIA, UNSPECIFIED HYPERLIPIDEMIA TYPE: ICD-10-CM

## 2023-05-03 RX ORDER — ATORVASTATIN CALCIUM 80 MG/1
80 TABLET, FILM COATED ORAL DAILY
Qty: 90 TABLET | Refills: 3 | Status: SHIPPED | OUTPATIENT
Start: 2023-05-03

## 2023-05-04 ENCOUNTER — TELEPHONE (OUTPATIENT)
Dept: INTERNAL MEDICINE CLINIC | Age: 88
End: 2023-05-04

## 2023-05-04 LAB
GAMMA INTERFERON BACKGROUND BLD IA-ACNC: 0.11 IU/ML
M TB IFN-G BLD-IMP: NEGATIVE
M TB IFN-G CD4+ T-CELLS BLD-ACNC: 0.18 IU/ML
M TBIFN-G CD4+ CD8+T-CELLS BLD-ACNC: 0.18 IU/ML
MITOGEN IGNF BLD-ACNC: >10 IU/ML
QUANTIFERON INCUBATION, QF1T: NORMAL
SERVICE CMNT-IMP: NORMAL

## 2023-05-05 ENCOUNTER — TELEPHONE (OUTPATIENT)
Dept: INTERNAL MEDICINE CLINIC | Age: 88
End: 2023-05-05

## 2023-05-10 ENCOUNTER — TELEPHONE (OUTPATIENT)
Age: 88
End: 2023-05-10

## 2023-05-30 ENCOUNTER — OFFICE VISIT (OUTPATIENT)
Age: 88
End: 2023-05-30

## 2023-05-30 VITALS
HEIGHT: 70 IN | SYSTOLIC BLOOD PRESSURE: 135 MMHG | HEART RATE: 74 BPM | RESPIRATION RATE: 14 BRPM | DIASTOLIC BLOOD PRESSURE: 55 MMHG | WEIGHT: 129.4 LBS | BODY MASS INDEX: 18.52 KG/M2 | OXYGEN SATURATION: 99 %

## 2023-05-30 DIAGNOSIS — B37.0 THRUSH: Primary | ICD-10-CM

## 2023-05-30 DIAGNOSIS — C34.11 MALIGNANT NEOPLASM OF UPPER LOBE OF RIGHT LUNG (HCC): ICD-10-CM

## 2023-05-30 DIAGNOSIS — R03.0 ELEVATED BLOOD PRESSURE READING: ICD-10-CM

## 2023-05-30 DIAGNOSIS — R53.83 FATIGUE, UNSPECIFIED TYPE: ICD-10-CM

## 2023-05-30 PROCEDURE — 1123F ACP DISCUSS/DSCN MKR DOCD: CPT | Performed by: INTERNAL MEDICINE

## 2023-05-30 PROCEDURE — 99214 OFFICE O/P EST MOD 30 MIN: CPT | Performed by: INTERNAL MEDICINE

## 2023-05-30 RX ORDER — MODAFINIL 200 MG/1
200 TABLET ORAL DAILY
Qty: 30 TABLET | Refills: 5 | Status: SHIPPED | OUTPATIENT
Start: 2023-05-30 | End: 2023-06-29

## 2023-05-30 SDOH — ECONOMIC STABILITY: FOOD INSECURITY: WITHIN THE PAST 12 MONTHS, YOU WORRIED THAT YOUR FOOD WOULD RUN OUT BEFORE YOU GOT MONEY TO BUY MORE.: NEVER TRUE

## 2023-05-30 SDOH — ECONOMIC STABILITY: FOOD INSECURITY: WITHIN THE PAST 12 MONTHS, THE FOOD YOU BOUGHT JUST DIDN'T LAST AND YOU DIDN'T HAVE MONEY TO GET MORE.: NEVER TRUE

## 2023-05-30 SDOH — ECONOMIC STABILITY: INCOME INSECURITY: HOW HARD IS IT FOR YOU TO PAY FOR THE VERY BASICS LIKE FOOD, HOUSING, MEDICAL CARE, AND HEATING?: NOT HARD AT ALL

## 2023-05-30 SDOH — ECONOMIC STABILITY: HOUSING INSECURITY
IN THE LAST 12 MONTHS, WAS THERE A TIME WHEN YOU DID NOT HAVE A STEADY PLACE TO SLEEP OR SLEPT IN A SHELTER (INCLUDING NOW)?: NO

## 2023-05-30 ASSESSMENT — PATIENT HEALTH QUESTIONNAIRE - PHQ9
SUM OF ALL RESPONSES TO PHQ QUESTIONS 1-9: 4
SUM OF ALL RESPONSES TO PHQ QUESTIONS 1-9: 4
2. FEELING DOWN, DEPRESSED OR HOPELESS: 2
SUM OF ALL RESPONSES TO PHQ QUESTIONS 1-9: 4
1. LITTLE INTEREST OR PLEASURE IN DOING THINGS: 2
SUM OF ALL RESPONSES TO PHQ9 QUESTIONS 1 & 2: 4
SUM OF ALL RESPONSES TO PHQ QUESTIONS 1-9: 4

## 2023-05-30 NOTE — ASSESSMENT & PLAN NOTE
Last visit  Seen 4/6 for lesion on the top of his mouth for the past 6 months   Lesion comes and goes   Was set up for a visit with OMFS, but pt cancelled because he wanted to see me first   Unclear etiology. Whitish flat lesion noted, does not come off with q-tip. Thrush? Has some spots on tongue as well.   Rec trial of nystatin mouth wash but advised pt that I agree with the OMFS referral given his history of smoking and lesion not resolving  ===  Resolved with nystatin  Pt wants nystatin on hand in case it comes back, rx sent, but advised pt that if it doesn't resolve with nystatin after 2 weeks, he needs to be evaluated

## 2023-05-30 NOTE — ASSESSMENT & PLAN NOTE
Elevated on arrival, but better on repeat  +history of orthostatic hypotension  Advised not to use modafinil if bp 140/90 or greater

## 2023-05-30 NOTE — ASSESSMENT & PLAN NOTE
Modafinil working, takes only every few days  Refill provided, given goodrx coupon for [facility]; advised not to use if BP elevated 140/90 or greater

## 2023-05-30 NOTE — PROGRESS NOTES
Note   Chief Complaint   fatigue    Susi Devi is a 80 y.o. male     1. Thrush  Assessment & Plan:  Last visit  Seen 4/6 for lesion on the top of his mouth for the past 6 months   Lesion comes and goes   Was set up for a visit with [facility], but pt cancelled because he wanted to see me first   Unclear etiology. Whitish flat lesion noted, does not come off with q-tip. Thrush? Has some spots on tongue as well. Rec trial of nystatin mouth wash but advised pt that I agree with the OMFS referral given his history of smoking and lesion not resolving  ===  Resolved with nystatin  Pt wants nystatin on hand in case it comes back, rx sent, but advised pt that if it doesn't resolve with nystatin after 2 weeks, he needs to be evaluated  Orders:  -     nystatin (MYCOSTATIN) 077095 UNIT/ML suspension; Take 5 mLs by mouth 4 times daily Retain in mouth as long as possible. Swish and spit . Do not use longer than 14 days, Oral, 4 TIMES DAILY Starting Tue 5/30/2023, Disp-280 mL, R-0, Normal  2. Fatigue, unspecified type  Assessment & Plan:   Modafinil working, takes only every few days  Refill provided, given [employer] coupon for [employer]; advised not to use if BP elevated 140/90 or greater  Orders:  -     modafinil (PROVIGIL) 200 MG tablet; Take 1 tablet by mouth daily for 30 days. Max Daily Amount: 200 mg, Disp-30 tablet, R-5Print  3. Malignant neoplasm of upper lobe of right lung [facility])  Assessment & Plan:  Just had CT scan, waiting on results, seeing pulm soon  4. Elevated blood pressure reading  Assessment & Plan:  Elevated on arrival, but better on repeat  +history of orthostatic hypotension  Advised not to use modafinil if bp 140/90 or greater         Benefits, risks, possible drug interactions, and side effects of all new medications were reviewed with the patient. Pt verbalized understanding. Return to clinic:  as needed    An electronic signature was used to authenticate this note.   Manuel Alas MD  Internal

## 2023-11-16 ENCOUNTER — OFFICE VISIT (OUTPATIENT)
Age: 88
End: 2023-11-16
Payer: MEDICARE

## 2023-11-16 VITALS
BODY MASS INDEX: 17.75 KG/M2 | RESPIRATION RATE: 16 BRPM | WEIGHT: 124 LBS | DIASTOLIC BLOOD PRESSURE: 60 MMHG | TEMPERATURE: 98 F | HEIGHT: 70 IN | OXYGEN SATURATION: 97 % | SYSTOLIC BLOOD PRESSURE: 132 MMHG | HEART RATE: 76 BPM

## 2023-11-16 DIAGNOSIS — R03.0 ELEVATED BLOOD PRESSURE READING: ICD-10-CM

## 2023-11-16 DIAGNOSIS — E78.5 HYPERLIPIDEMIA, UNSPECIFIED HYPERLIPIDEMIA TYPE: ICD-10-CM

## 2023-11-16 DIAGNOSIS — Z12.5 SCREENING FOR PROSTATE CANCER: ICD-10-CM

## 2023-11-16 DIAGNOSIS — Z76.89 ENCOUNTER TO ESTABLISH CARE: Primary | ICD-10-CM

## 2023-11-16 DIAGNOSIS — R63.4 ABNORMAL WEIGHT LOSS: ICD-10-CM

## 2023-11-16 DIAGNOSIS — J44.9 CHRONIC OBSTRUCTIVE PULMONARY DISEASE, UNSPECIFIED COPD TYPE (HCC): ICD-10-CM

## 2023-11-16 DIAGNOSIS — C34.11 MALIGNANT NEOPLASM OF UPPER LOBE OF RIGHT LUNG (HCC): ICD-10-CM

## 2023-11-16 DIAGNOSIS — F17.200 SMOKER: ICD-10-CM

## 2023-11-16 LAB
ALBUMIN SERPL-MCNC: 3.7 G/DL (ref 3.5–5)
ALBUMIN/GLOB SERPL: 1 (ref 1.1–2.2)
ALP SERPL-CCNC: 50 U/L (ref 45–117)
ALT SERPL-CCNC: 32 U/L (ref 12–78)
ANION GAP SERPL CALC-SCNC: 2 MMOL/L (ref 5–15)
AST SERPL-CCNC: 18 U/L (ref 15–37)
BASOPHILS # BLD: 0 K/UL (ref 0–0.1)
BASOPHILS NFR BLD: 0 % (ref 0–1)
BILIRUB SERPL-MCNC: 0.5 MG/DL (ref 0.2–1)
BUN SERPL-MCNC: 32 MG/DL (ref 6–20)
BUN/CREAT SERPL: 30 (ref 12–20)
CALCIUM SERPL-MCNC: 9.7 MG/DL (ref 8.5–10.1)
CHLORIDE SERPL-SCNC: 103 MMOL/L (ref 97–108)
CHOLEST SERPL-MCNC: 214 MG/DL
CO2 SERPL-SCNC: 30 MMOL/L (ref 21–32)
CREAT SERPL-MCNC: 1.06 MG/DL (ref 0.7–1.3)
DIFFERENTIAL METHOD BLD: ABNORMAL
EOSINOPHIL # BLD: 0.1 K/UL (ref 0–0.4)
EOSINOPHIL NFR BLD: 1 % (ref 0–7)
ERYTHROCYTE [DISTWIDTH] IN BLOOD BY AUTOMATED COUNT: 13.5 % (ref 11.5–14.5)
GLOBULIN SER CALC-MCNC: 3.6 G/DL (ref 2–4)
GLUCOSE SERPL-MCNC: 86 MG/DL (ref 65–100)
HCT VFR BLD AUTO: 34.8 % (ref 36.6–50.3)
HDLC SERPL-MCNC: 77 MG/DL
HDLC SERPL: 2.8 (ref 0–5)
HGB BLD-MCNC: 10.9 G/DL (ref 12.1–17)
IMM GRANULOCYTES # BLD AUTO: 0 K/UL (ref 0–0.04)
IMM GRANULOCYTES NFR BLD AUTO: 0 % (ref 0–0.5)
LDLC SERPL CALC-MCNC: 122.8 MG/DL (ref 0–100)
LYMPHOCYTES # BLD: 1.8 K/UL (ref 0.8–3.5)
LYMPHOCYTES NFR BLD: 15 % (ref 12–49)
MCH RBC QN AUTO: 29.5 PG (ref 26–34)
MCHC RBC AUTO-ENTMCNC: 31.3 G/DL (ref 30–36.5)
MCV RBC AUTO: 94.3 FL (ref 80–99)
MONOCYTES # BLD: 0.8 K/UL (ref 0–1)
MONOCYTES NFR BLD: 7 % (ref 5–13)
NEUTS SEG # BLD: 9.2 K/UL (ref 1.8–8)
NEUTS SEG NFR BLD: 77 % (ref 32–75)
NRBC # BLD: 0 K/UL (ref 0–0.01)
NRBC BLD-RTO: 0 PER 100 WBC
PLATELET # BLD AUTO: 210 K/UL (ref 150–400)
PMV BLD AUTO: 9.2 FL (ref 8.9–12.9)
POTASSIUM SERPL-SCNC: 4.2 MMOL/L (ref 3.5–5.1)
PROT SERPL-MCNC: 7.3 G/DL (ref 6.4–8.2)
PSA SERPL-MCNC: 0.2 NG/ML (ref 0.01–4)
RBC # BLD AUTO: 3.69 M/UL (ref 4.1–5.7)
SODIUM SERPL-SCNC: 135 MMOL/L (ref 136–145)
TRIGL SERPL-MCNC: 71 MG/DL
TSH SERPL DL<=0.05 MIU/L-ACNC: 1.68 UIU/ML (ref 0.36–3.74)
VLDLC SERPL CALC-MCNC: 14.2 MG/DL
WBC # BLD AUTO: 11.9 K/UL (ref 4.1–11.1)

## 2023-11-16 PROCEDURE — 3023F SPIROM DOC REV: CPT | Performed by: NURSE PRACTITIONER

## 2023-11-16 PROCEDURE — 1123F ACP DISCUSS/DSCN MKR DOCD: CPT | Performed by: NURSE PRACTITIONER

## 2023-11-16 PROCEDURE — 4004F PT TOBACCO SCREEN RCVD TLK: CPT | Performed by: NURSE PRACTITIONER

## 2023-11-16 PROCEDURE — G8427 DOCREV CUR MEDS BY ELIG CLIN: HCPCS | Performed by: NURSE PRACTITIONER

## 2023-11-16 PROCEDURE — 99214 OFFICE O/P EST MOD 30 MIN: CPT | Performed by: NURSE PRACTITIONER

## 2023-11-16 PROCEDURE — G8484 FLU IMMUNIZE NO ADMIN: HCPCS | Performed by: NURSE PRACTITIONER

## 2023-11-16 PROCEDURE — G8419 CALC BMI OUT NRM PARAM NOF/U: HCPCS | Performed by: NURSE PRACTITIONER

## 2023-11-16 ASSESSMENT — ENCOUNTER SYMPTOMS
EYE REDNESS: 0
SINUS PAIN: 0
BLOOD IN STOOL: 0
WHEEZING: 0
CONSTIPATION: 0
SINUS PRESSURE: 0
RHINORRHEA: 0
GASTROINTESTINAL NEGATIVE: 1
CHEST TIGHTNESS: 0
BACK PAIN: 0
EYE PAIN: 0
DIARRHEA: 0
EYES NEGATIVE: 1
ABDOMINAL PAIN: 0
VOMITING: 0
NAUSEA: 0
STRIDOR: 0
SHORTNESS OF BREATH: 0
COUGH: 1

## 2023-11-16 ASSESSMENT — PATIENT HEALTH QUESTIONNAIRE - PHQ9
SUM OF ALL RESPONSES TO PHQ9 QUESTIONS 1 & 2: 0
SUM OF ALL RESPONSES TO PHQ QUESTIONS 1-9: 0
1. LITTLE INTEREST OR PLEASURE IN DOING THINGS: 0
SUM OF ALL RESPONSES TO PHQ QUESTIONS 1-9: 0
2. FEELING DOWN, DEPRESSED OR HOPELESS: 0
SUM OF ALL RESPONSES TO PHQ QUESTIONS 1-9: 0
SUM OF ALL RESPONSES TO PHQ QUESTIONS 1-9: 0

## 2023-11-16 NOTE — PROGRESS NOTES
No tenderness. Abdominal:      General: Bowel sounds are normal. There is no distension. Palpations: Abdomen is soft. Tenderness: There is no abdominal tenderness. There is no right CVA tenderness or left CVA tenderness. Musculoskeletal:         General: Normal range of motion. Cervical back: Normal range of motion. Right lower leg: No edema. Left lower leg: No edema. Skin:     General: Skin is warm and dry. Neurological:      Mental Status: He is alert and oriented to person, place, and time.    Psychiatric:         Mood and Affect: Mood normal.         Behavior: Behavior normal.

## 2023-11-20 DIAGNOSIS — D64.9 ANEMIA, UNSPECIFIED TYPE: Primary | ICD-10-CM

## 2023-11-20 DIAGNOSIS — Z12.11 SCREENING FOR COLON CANCER: ICD-10-CM

## 2023-11-22 DIAGNOSIS — Z12.11 SCREENING FOR COLON CANCER: Primary | ICD-10-CM

## 2023-11-30 DIAGNOSIS — D64.9 ANEMIA, UNSPECIFIED TYPE: ICD-10-CM

## 2023-11-30 LAB
BASOPHILS # BLD: 0 K/UL (ref 0–0.1)
BASOPHILS NFR BLD: 0 % (ref 0–1)
DIFFERENTIAL METHOD BLD: ABNORMAL
EOSINOPHIL # BLD: 0.1 K/UL (ref 0–0.4)
EOSINOPHIL NFR BLD: 1 % (ref 0–7)
ERYTHROCYTE [DISTWIDTH] IN BLOOD BY AUTOMATED COUNT: 13.7 % (ref 11.5–14.5)
FERRITIN SERPL-MCNC: 218 NG/ML (ref 26–388)
HCT VFR BLD AUTO: 30.7 % (ref 36.6–50.3)
HGB BLD-MCNC: 9.7 G/DL (ref 12.1–17)
IMM GRANULOCYTES # BLD AUTO: 0 K/UL (ref 0–0.04)
IMM GRANULOCYTES NFR BLD AUTO: 0 % (ref 0–0.5)
IRON SATN MFR SERPL: 29 % (ref 20–50)
IRON SERPL-MCNC: 59 UG/DL (ref 35–150)
LYMPHOCYTES # BLD: 1.1 K/UL (ref 0.8–3.5)
LYMPHOCYTES NFR BLD: 15 % (ref 12–49)
MCH RBC QN AUTO: 29.4 PG (ref 26–34)
MCHC RBC AUTO-ENTMCNC: 31.6 G/DL (ref 30–36.5)
MCV RBC AUTO: 93 FL (ref 80–99)
MONOCYTES # BLD: 0.5 K/UL (ref 0–1)
MONOCYTES NFR BLD: 7 % (ref 5–13)
NEUTS SEG # BLD: 5.4 K/UL (ref 1.8–8)
NEUTS SEG NFR BLD: 77 % (ref 32–75)
NRBC # BLD: 0 K/UL (ref 0–0.01)
NRBC BLD-RTO: 0 PER 100 WBC
PLATELET # BLD AUTO: 192 K/UL (ref 150–400)
PMV BLD AUTO: 9 FL (ref 8.9–12.9)
RBC # BLD AUTO: 3.3 M/UL (ref 4.1–5.7)
TIBC SERPL-MCNC: 206 UG/DL (ref 250–450)
WBC # BLD AUTO: 7.1 K/UL (ref 4.1–11.1)

## 2024-01-22 ENCOUNTER — OFFICE VISIT (OUTPATIENT)
Age: 89
End: 2024-01-22
Payer: MEDICARE

## 2024-01-22 VITALS
DIASTOLIC BLOOD PRESSURE: 60 MMHG | HEIGHT: 70 IN | TEMPERATURE: 97.7 F | RESPIRATION RATE: 16 BRPM | SYSTOLIC BLOOD PRESSURE: 128 MMHG | HEART RATE: 71 BPM | OXYGEN SATURATION: 95 % | WEIGHT: 125 LBS | BODY MASS INDEX: 17.9 KG/M2

## 2024-01-22 DIAGNOSIS — R63.4 ABNORMAL WEIGHT LOSS: ICD-10-CM

## 2024-01-22 DIAGNOSIS — J44.9 CHRONIC OBSTRUCTIVE PULMONARY DISEASE, UNSPECIFIED COPD TYPE (HCC): ICD-10-CM

## 2024-01-22 DIAGNOSIS — K40.90 UNILATERAL INGUINAL HERNIA WITHOUT OBSTRUCTION OR GANGRENE, RECURRENCE NOT SPECIFIED: ICD-10-CM

## 2024-01-22 DIAGNOSIS — R53.82 CHRONIC FATIGUE: Primary | ICD-10-CM

## 2024-01-22 DIAGNOSIS — C34.11 MALIGNANT NEOPLASM OF UPPER LOBE OF RIGHT LUNG (HCC): ICD-10-CM

## 2024-01-22 DIAGNOSIS — F17.200 SMOKER: ICD-10-CM

## 2024-01-22 PROCEDURE — 99214 OFFICE O/P EST MOD 30 MIN: CPT | Performed by: NURSE PRACTITIONER

## 2024-01-22 PROCEDURE — G8419 CALC BMI OUT NRM PARAM NOF/U: HCPCS | Performed by: NURSE PRACTITIONER

## 2024-01-22 PROCEDURE — G8484 FLU IMMUNIZE NO ADMIN: HCPCS | Performed by: NURSE PRACTITIONER

## 2024-01-22 PROCEDURE — 1123F ACP DISCUSS/DSCN MKR DOCD: CPT | Performed by: NURSE PRACTITIONER

## 2024-01-22 PROCEDURE — 3023F SPIROM DOC REV: CPT | Performed by: NURSE PRACTITIONER

## 2024-01-22 PROCEDURE — 4004F PT TOBACCO SCREEN RCVD TLK: CPT | Performed by: NURSE PRACTITIONER

## 2024-01-22 PROCEDURE — G8427 DOCREV CUR MEDS BY ELIG CLIN: HCPCS | Performed by: NURSE PRACTITIONER

## 2024-01-22 ASSESSMENT — ENCOUNTER SYMPTOMS
CONSTIPATION: 0
ABDOMINAL PAIN: 0
EYES NEGATIVE: 1
RHINORRHEA: 0
SINUS PAIN: 0
SHORTNESS OF BREATH: 1
NAUSEA: 0
BACK PAIN: 0
BLOOD IN STOOL: 0
EYE REDNESS: 0
SINUS PRESSURE: 0
DIARRHEA: 0
GASTROINTESTINAL NEGATIVE: 1
COUGH: 0
EYE PAIN: 0
CHEST TIGHTNESS: 0
VOMITING: 0

## 2024-01-22 ASSESSMENT — PATIENT HEALTH QUESTIONNAIRE - PHQ9
SUM OF ALL RESPONSES TO PHQ QUESTIONS 1-9: 0
SUM OF ALL RESPONSES TO PHQ QUESTIONS 1-9: 0
1. LITTLE INTEREST OR PLEASURE IN DOING THINGS: 0
SUM OF ALL RESPONSES TO PHQ QUESTIONS 1-9: 0
2. FEELING DOWN, DEPRESSED OR HOPELESS: 0
SUM OF ALL RESPONSES TO PHQ9 QUESTIONS 1 & 2: 0
SUM OF ALL RESPONSES TO PHQ QUESTIONS 1-9: 0

## 2024-01-22 NOTE — PROGRESS NOTES
Assessment and Plan     1. Chronic fatigue: Discussed starting iron supplements, he declined. He would like to consult with oncologist.   2. Abnormal weight loss: Could be due to chemotherapy treatment. Continue with small and frequent meal.   3. Smoker: Smoking cessation recommended.   4. Malignant neoplasm of upper lobe of right lung (HCC): Continue to follow up with oncologist.   5. Chronic obstructive pulmonary disease, unspecified COPD type (HCC): Will continue to follow up with pulmonologist.   6. Unilateral inguinal hernia without obstruction or gangrene, recurrence not specified: Non-tender, imaging studies ordered.   -     US PELVIS LIMITED; Future       Benefits, risks, possible drug interactions, and side effects of all new medications were reviewed with the patient. Pt verbalized understanding.    An electronic signature was used to authenticate this note.  ANGIE Mackey - CNP  1/22/2024      Follow-up and Dispositions    Return if symptoms worsen or fail to improve.          History of Present Illness   Chief Complaint     Josiah Person is a 89 y.o. male here for had concerns including Fatigue (Mr. Cobb presents today for concerns of chronic fatigue and dizziness for several years. ).   Mr. Person presents today with reports of fatigue and dizziness, chronic. Dizziness often occurs when standing up and resolves shortly after. Pt is currently being followed by oncologist at [facility], he recently completed chemotherapy treatment last month, and will follow up with PET scan next month. Pt has history of COPD, uses oxygen 3L via nasal cannula at nighttime. Follows with  Dr. Gibson  Positive for occasional chronic episodes of shortness of breath. Positive for weight changes. Pt is independent with ADLs, lives at home with wife. Eats about 2-3 meals per day, admits poor hydration about 2 glasses of water daily. Pt is a daily smoker. Denies chest pain.     Tobacco Use      Smoking

## 2024-02-15 ENCOUNTER — TELEPHONE (OUTPATIENT)
Age: 89
End: 2024-02-15

## 2024-02-15 NOTE — TELEPHONE ENCOUNTER
----- Message from Anjelica Riggs sent at 2/15/2024  1:34 PM EST -----  Subject: Message to Provider    QUESTIONS  Information for Provider? Lianet Turpin [facility] called on 02/15   @ 1:29 pm. They are following up on a referral from home health services   asking if Nurse Redd will follow Fran Person for Home Health.   Please call back at this number? [phone] Ask for Lianet  ---------------------------------------------------------------------------  --------------  CALL BACK INFO  [phone]; OK to leave message on voicemail  ---------------------------------------------------------------------------  --------------  SCRIPT ANSWERS  Relationship to Patient? Covered Entity  Covered Entity Type? Home Health Care?  Representative Name? Lianet Turpin [facility]

## 2024-04-23 NOTE — TELEPHONE ENCOUNTER
had a call from [facility] in [place] called asking who he might be able to send a referral to for a scope for the above patient.   Please return call to Kale Crawford please call office [phone]  Direct line [phone]     ----- Message from Eloina Rand sent at 5/1/2023 10:13 AM EDT -----  Subject: Message to Provider    QUESTIONS  Information for Provider? Pt is called in regards of medication . Anabell Le Pt said   he discussed medication to be sent to his pharmacy but he has yet to   receive it . Please reach out to pt in this concern . Pt called in regards   of the mouth wash   ---------------------------------------------------------------------------  --------------  Aimee Moyer INFO  [phone]; OK to leave message on voicemail  ---------------------------------------------------------------------------  --------------  SCRIPT ANSWERS  Relationship to Patient?  Self 19-Apr-2020 19:00